# Patient Record
Sex: FEMALE | Race: WHITE | NOT HISPANIC OR LATINO | Employment: OTHER | URBAN - METROPOLITAN AREA
[De-identification: names, ages, dates, MRNs, and addresses within clinical notes are randomized per-mention and may not be internally consistent; named-entity substitution may affect disease eponyms.]

---

## 2017-02-27 ENCOUNTER — TELEPHONE (OUTPATIENT)
Dept: BARIATRICS | Facility: CLINIC | Age: 57
End: 2017-02-27

## 2017-03-22 DIAGNOSIS — Z00.00 ROUTINE GENERAL MEDICAL EXAMINATION AT A HEALTH CARE FACILITY: Primary | ICD-10-CM

## 2017-04-12 RX ORDER — LEVOTHYROXINE SODIUM 112 UG/1
112 TABLET ORAL
Qty: 90 TABLET | Refills: 0 | Status: SHIPPED | OUTPATIENT
Start: 2017-04-12 | End: 2018-10-19

## 2017-05-17 ENCOUNTER — TELEPHONE (OUTPATIENT)
Dept: INTERNAL MEDICINE | Facility: CLINIC | Age: 57
End: 2017-05-17

## 2017-05-17 NOTE — TELEPHONE ENCOUNTER
Spoke with Nallely at [facility], states that she thinks it was about getting [employer] and that dept was out for lunch. Left message for someone to return call.

## 2017-05-17 NOTE — TELEPHONE ENCOUNTER
----- Message from Quoc Linton sent at 5/17/2017  8:46 AM CDT -----  Contact: Alessandra Rodriguez [phone]  Called and stated, I faxed over a request on behalf of the above pt on 05/12/2017 requiring your approval/Signature, please advice    Thanks

## 2017-05-19 ENCOUNTER — TELEPHONE (OUTPATIENT)
Dept: INTERNAL MEDICINE | Facility: CLINIC | Age: 57
End: 2017-05-19

## 2017-05-19 NOTE — TELEPHONE ENCOUNTER
----- Message from Flavia Samson sent at 5/19/2017  9:12 AM CDT -----  Contact: Tien Lindo/ [facility]/[phone]  Tien with the diet clinic is calling in regards following up on request form for approval patient need to be seen in that clinic. Tien stated that form was faxed 05/12/17, and if has not been receive to please inform, for him to refax it. Please call and advise.             Thank you

## 2017-05-19 NOTE — TELEPHONE ENCOUNTER
----- Message from Flavia Samson sent at 5/19/2017  9:12 AM CDT -----  Contact: Tien Lindo/ [facility]/[phone]  Tien with the diet clinic is calling in regards following up on request form for approval patient need to be seen in that clinic. Tien stated that form was faxed 05/12/17, and if has not been receive to please inform, for him to refax it. Please call and advise.             Thank you

## 2017-05-26 ENCOUNTER — TELEPHONE (OUTPATIENT)
Dept: INTERNAL MEDICINE | Facility: CLINIC | Age: 57
End: 2017-05-26

## 2017-05-26 NOTE — TELEPHONE ENCOUNTER
----- Message from Quoc Linton sent at 5/26/2017  9:41 AM CDT -----  Contact: Tien Aparicio, Alessandra [facility] [phone]  Tien is calling to get a response about the order for the Diet program he faxed over for the above pt,requesting a call back, advice    Thanks

## 2017-07-14 ENCOUNTER — CLINICAL SUPPORT (OUTPATIENT)
Dept: INFECTIOUS DISEASES | Facility: CLINIC | Age: 57
End: 2017-07-14
Payer: COMMERCIAL

## 2017-07-14 ENCOUNTER — HOSPITAL ENCOUNTER (OUTPATIENT)
Dept: RADIOLOGY | Facility: HOSPITAL | Age: 57
Discharge: HOME | End: 2017-07-14
Attending: INTERNAL MEDICINE
Payer: COMMERCIAL

## 2017-07-14 ENCOUNTER — HOSPITAL ENCOUNTER (OUTPATIENT)
Dept: CARDIOLOGY | Facility: CLINIC | Age: 57
Discharge: HOME | End: 2017-07-14
Payer: COMMERCIAL

## 2017-07-14 ENCOUNTER — OFFICE VISIT (OUTPATIENT)
Dept: INTERNAL MEDICINE | Facility: CLINIC | Age: 57
End: 2017-07-14
Payer: COMMERCIAL

## 2017-07-14 ENCOUNTER — CLINICAL SUPPORT (OUTPATIENT)
Dept: INTERNAL MEDICINE | Facility: CLINIC | Age: 57
End: 2017-07-14
Payer: COMMERCIAL

## 2017-07-14 ENCOUNTER — HOSPITAL ENCOUNTER (OUTPATIENT)
Dept: RADIOLOGY | Facility: CLINIC | Age: 57
Discharge: HOME | End: 2017-07-14
Payer: COMMERCIAL

## 2017-07-14 VITALS
HEART RATE: 69 BPM | OXYGEN SATURATION: 96 % | HEIGHT: 64 IN | TEMPERATURE: 98 F | SYSTOLIC BLOOD PRESSURE: 118 MMHG | BODY MASS INDEX: 33.57 KG/M2 | WEIGHT: 196.63 LBS | DIASTOLIC BLOOD PRESSURE: 78 MMHG

## 2017-07-14 VITALS — BODY MASS INDEX: 33.46 KG/M2 | WEIGHT: 196 LBS | HEIGHT: 64 IN

## 2017-07-14 DIAGNOSIS — Z00.00 HEALTH CARE MAINTENANCE: Primary | ICD-10-CM

## 2017-07-14 DIAGNOSIS — Z00.00 ROUTINE GENERAL MEDICAL EXAMINATION AT A HEALTH CARE FACILITY: ICD-10-CM

## 2017-07-14 DIAGNOSIS — E03.9 PRIMARY HYPOTHYROIDISM: ICD-10-CM

## 2017-07-14 DIAGNOSIS — Z12.31 ENCOUNTER FOR SCREENING MAMMOGRAM FOR BREAST CANCER: ICD-10-CM

## 2017-07-14 DIAGNOSIS — Z00.00 ROUTINE GENERAL MEDICAL EXAMINATION AT A HEALTH CARE FACILITY: Primary | ICD-10-CM

## 2017-07-14 DIAGNOSIS — M85.80 OSTEOPENIA, UNSPECIFIED LOCATION: ICD-10-CM

## 2017-07-14 LAB
25(OH)D3+25(OH)D2 SERPL-MCNC: 42 NG/ML
ALBUMIN SERPL BCP-MCNC: 4 G/DL
ALP SERPL-CCNC: 83 U/L
ALT SERPL W/O P-5'-P-CCNC: 17 U/L
ANION GAP SERPL CALC-SCNC: 10 MMOL/L
AST SERPL-CCNC: 23 U/L
BILIRUB SERPL-MCNC: 0.7 MG/DL
BUN SERPL-MCNC: 18 MG/DL
CALCIUM SERPL-MCNC: 9.5 MG/DL
CHLORIDE SERPL-SCNC: 103 MMOL/L
CHOLEST/HDLC SERPL: 2.8 {RATIO}
CO2 SERPL-SCNC: 26 MMOL/L
CREAT SERPL-MCNC: 0.8 MG/DL
ERYTHROCYTE [DISTWIDTH] IN BLOOD BY AUTOMATED COUNT: 13.6 %
EST. GFR  (AFRICAN AMERICAN): >60 ML/MIN/1.73 M^2
EST. GFR  (NON AFRICAN AMERICAN): >60 ML/MIN/1.73 M^2
GLUCOSE SERPL-MCNC: 79 MG/DL
HCT VFR BLD AUTO: 39.8 %
HDL/CHOLESTEROL RATIO: 36.1 %
HDLC SERPL-MCNC: 183 MG/DL
HDLC SERPL-MCNC: 66 MG/DL
HGB BLD-MCNC: 13.7 G/DL
HIV 1+2 AB+HIV1 P24 AG SERPL QL IA: NEGATIVE
LDLC SERPL CALC-MCNC: 107 MG/DL
MCH RBC QN AUTO: 31.5 PG
MCHC RBC AUTO-ENTMCNC: 34.4 %
MCV RBC AUTO: 92 FL
NONHDLC SERPL-MCNC: 117 MG/DL
PLATELET # BLD AUTO: 216 K/UL
PMV BLD AUTO: 10.3 FL
POTASSIUM SERPL-SCNC: 4.1 MMOL/L
PROT SERPL-MCNC: 7.5 G/DL
RBC # BLD AUTO: 4.35 M/UL
SODIUM SERPL-SCNC: 139 MMOL/L
TRIGL SERPL-MCNC: 50 MG/DL
TSH SERPL DL<=0.005 MIU/L-ACNC: 3.99 UIU/ML
WBC # BLD AUTO: 5.02 K/UL

## 2017-07-14 PROCEDURE — 80061 LIPID PANEL: CPT

## 2017-07-14 PROCEDURE — 77063 BREAST TOMOSYNTHESIS BI: CPT | Mod: 26,,, | Performed by: RADIOLOGY

## 2017-07-14 PROCEDURE — 80053 COMPREHEN METABOLIC PANEL: CPT

## 2017-07-14 PROCEDURE — 85027 COMPLETE CBC AUTOMATED: CPT

## 2017-07-14 PROCEDURE — 99999 PR PBB SHADOW E&M-EST. PATIENT-LVL III: CPT | Mod: PBBFAC,,, | Performed by: INTERNAL MEDICINE

## 2017-07-14 PROCEDURE — 93000 ELECTROCARDIOGRAM COMPLETE: CPT | Mod: S$GLB,,, | Performed by: INTERNAL MEDICINE

## 2017-07-14 PROCEDURE — 99999 PR PBB SHADOW E&M-EST. PATIENT-LVL I: CPT | Mod: PBBFAC,,,

## 2017-07-14 PROCEDURE — 86703 HIV-1/HIV-2 1 RESULT ANTBDY: CPT

## 2017-07-14 PROCEDURE — 77067 SCR MAMMO BI INCL CAD: CPT | Mod: 26,,, | Performed by: RADIOLOGY

## 2017-07-14 PROCEDURE — 90715 TDAP VACCINE 7 YRS/> IM: CPT | Mod: S$GLB,,, | Performed by: INTERNAL MEDICINE

## 2017-07-14 PROCEDURE — 77067 SCR MAMMO BI INCL CAD: CPT | Mod: TC

## 2017-07-14 PROCEDURE — 90471 IMMUNIZATION ADMIN: CPT | Mod: S$GLB,,, | Performed by: INTERNAL MEDICINE

## 2017-07-14 PROCEDURE — 71020 XR CHEST PA AND LATERAL: CPT | Mod: TC

## 2017-07-14 PROCEDURE — 77080 DXA BONE DENSITY AXIAL: CPT | Mod: 26,,, | Performed by: INTERNAL MEDICINE

## 2017-07-14 PROCEDURE — 71020 XR CHEST PA AND LATERAL: CPT | Mod: 26,,, | Performed by: RADIOLOGY

## 2017-07-14 PROCEDURE — 77080 DXA BONE DENSITY AXIAL: CPT | Mod: TC

## 2017-07-14 PROCEDURE — 84443 ASSAY THYROID STIM HORMONE: CPT

## 2017-07-14 PROCEDURE — 82306 VITAMIN D 25 HYDROXY: CPT

## 2017-07-14 PROCEDURE — 99396 PREV VISIT EST AGE 40-64: CPT | Mod: S$GLB,,, | Performed by: INTERNAL MEDICINE

## 2017-07-14 NOTE — PATIENT INSTRUCTIONS
Please make sure you get enough vitamin D and calcium. Goal is 1200mg calcium and 800 IU of vitamin D daily.

## 2017-07-14 NOTE — PROGRESS NOTES
"Subjective:       Patient ID: Kim Garcia is a 57 y.o. female.    Chief Complaint: [employer]    HPI   58 yo here for [employer] physical.     Cmp, cbc, lipids nl  hiv pending    Osteopenia on bone density.   Taking calcium and vitamin d OTC.     She is doing Ideal protein. Hx gastric sleeve.   No alcohol currently as working on weight loss.     Review of Systems   Constitutional: Negative for fever.   HENT: Negative.    Eyes: Negative.    Respiratory: Negative for shortness of breath.    Cardiovascular: Negative for chest pain and leg swelling.   Gastrointestinal: Negative for abdominal pain, diarrhea, nausea and vomiting.   Genitourinary: Negative.    Musculoskeletal: Negative for arthralgias.   Skin: Negative for rash.   Psychiatric/Behavioral: Negative.        Objective:   /78 (BP Location: Left arm, Patient Position: Sitting, BP Method: Manual)   Pulse 69   Temp 97.5 °F (36.4 °C) (Oral)   Ht 5' 4" (1.626 m)   Wt 89.2 kg (196 lb 10.4 oz)   SpO2 96%   BMI 33.76 kg/m²      Physical Exam   Constitutional: She is oriented to person, place, and time. She appears well-developed and well-nourished. No distress.   HENT:   Head: Normocephalic and atraumatic.   Cardiovascular: Normal rate and regular rhythm.    Pulmonary/Chest: Effort normal. No respiratory distress. She has no wheezes. She has no rales.   Neurological: She is alert and oriented to person, place, and time.   Skin: Skin is warm and dry. She is not diaphoretic.   Psychiatric: She has a normal mood and affect. Her behavior is normal.       Assessment:       1. Health care maintenance    2. Primary hypothyroidism, 1999    3. Osteopenia, unspecified location        Plan:       Kim was seen today for [employer].    Diagnoses and all orders for this visit:    Health care maintenance  Labs reviewed  Will get [facility] at [facility] today  mmg today scheduled  Will do pap with gyn at [facility]    Primary hypothyroidism  Will " add tsh to labs  Cont levothyroxine 112mcg    Osteopenia, unspecified location  Discussed calcium and vitamin d  Will add vitamin d to labs        Gyn - [facility] - due to have pap

## 2017-11-05 DIAGNOSIS — Z98.84 BARIATRIC SURGERY STATUS: ICD-10-CM

## 2017-11-05 DIAGNOSIS — E55.9 VITAMIN D DEFICIENCY DISEASE: Primary | ICD-10-CM

## 2017-11-05 DIAGNOSIS — E03.9 PRIMARY HYPOTHYROIDISM: ICD-10-CM

## 2017-11-06 RX ORDER — LEVOTHYROXINE SODIUM 112 UG/1
112 TABLET ORAL
Qty: 90 TABLET | Refills: 3 | Status: SHIPPED | OUTPATIENT
Start: 2017-11-06 | End: 2019-02-22 | Stop reason: SDUPTHER

## 2017-11-06 NOTE — TELEPHONE ENCOUNTER
Last seen November 2015. Saw another provider for health maintenance in July 2017.     please place on schedule for physical in July 2018. Patient should call for an appointment in April or May 2018

## 2018-02-16 ENCOUNTER — OFFICE VISIT (OUTPATIENT)
Dept: URGENT CARE | Facility: CLINIC | Age: 58
End: 2018-02-16
Payer: COMMERCIAL

## 2018-02-16 VITALS
SYSTOLIC BLOOD PRESSURE: 131 MMHG | BODY MASS INDEX: 37.56 KG/M2 | TEMPERATURE: 99 F | HEIGHT: 64 IN | DIASTOLIC BLOOD PRESSURE: 88 MMHG | RESPIRATION RATE: 16 BRPM | HEART RATE: 81 BPM | OXYGEN SATURATION: 98 % | WEIGHT: 220 LBS

## 2018-02-16 DIAGNOSIS — R05.9 COUGH: ICD-10-CM

## 2018-02-16 DIAGNOSIS — J06.9 UPPER RESPIRATORY TRACT INFECTION, UNSPECIFIED TYPE: Primary | ICD-10-CM

## 2018-02-16 DIAGNOSIS — Z20.828 EXPOSURE TO THE FLU: ICD-10-CM

## 2018-02-16 LAB
CTP QC/QA: YES
FLUAV AG NPH QL: NEGATIVE
FLUBV AG NPH QL: NEGATIVE

## 2018-02-16 PROCEDURE — 99214 OFFICE O/P EST MOD 30 MIN: CPT | Mod: S$GLB,,, | Performed by: NURSE PRACTITIONER

## 2018-02-16 PROCEDURE — 3008F BODY MASS INDEX DOCD: CPT | Mod: S$GLB,,, | Performed by: NURSE PRACTITIONER

## 2018-02-16 PROCEDURE — 87804 INFLUENZA ASSAY W/OPTIC: CPT | Mod: QW,S$GLB,, | Performed by: NURSE PRACTITIONER

## 2018-02-16 RX ORDER — BENZONATATE 100 MG/1
100 CAPSULE ORAL EVERY 6 HOURS PRN
Qty: 30 CAPSULE | Refills: 1 | Status: SHIPPED | OUTPATIENT
Start: 2018-02-16 | End: 2018-10-19

## 2018-02-16 RX ORDER — PROMETHAZINE HYDROCHLORIDE AND DEXTROMETHORPHAN HYDROBROMIDE 6.25; 15 MG/5ML; MG/5ML
5 SYRUP ORAL NIGHTLY PRN
Qty: 118 ML | Refills: 0 | Status: SHIPPED | OUTPATIENT
Start: 2018-02-16 | End: 2018-02-26

## 2018-02-16 RX ORDER — OSELTAMIVIR PHOSPHATE 75 MG/1
75 CAPSULE ORAL DAILY
Qty: 10 CAPSULE | Refills: 0 | Status: SHIPPED | OUTPATIENT
Start: 2018-02-16 | End: 2018-02-26

## 2018-02-16 NOTE — PATIENT INSTRUCTIONS
"                                                         URI   If your condition worsens or fails to improve we recommend that you receive another evaluation at the ER immediately or contact your PCP to discuss your concerns or return here. You must understand that you've received an urgent care treatment only and that you may be released before all your medical problems are known or treated. You the patient will arrange for follouwp care as instructed.   If we discussed that I think your illness is viral, it will not respond to antibiotics and will last 5-7 days. If we discussed "wait and see" antibiotics and if over the next few days the symptoms worsen start the antibiotics I have given you.   -  If you are female and on BCP and do take the antibiotics, use additional methods to prevent pregnancy while on the antibiotics and for one cycle after.   -  Flonase (fluticasone) is a nasal spray which is available over the counter and may help with your symptoms.   Take prescription cough meds (pills) as prescribed; take prescription cough syrup at night as needed for cough.  Do not take both the prescribed cough pills and syrup at the same time.   -  If you have hypertension avoid using the "D" which is the decongestant.  Instead you can use Coricidin HBP for cold and cough symptoms.    -  If you just have drainage you can take plain Zyrtec, Claritin or Allegra   -  If you just have a congested feeling you can take pseudoephedrine (unless you have high blood pressure) which you have to sign for behind the counter. Do not buy the phenylephrine which is on the shelf as it is not effective   -  Rest and fluids are also important.   -  Tylenol or ibuprofen can also be used as directed for pain unless you have an allergy to them or medical condition such as stomach ulcers, kidney or liver disease or blood thinners etc for which you should not be taking these type of medications.   -  If you are flying in the next few days " Afrin nose drops for the airplane flight upon take off and landing may help. Other than at those times refrain from using afrin.   - If you were prescribed a narcotic do not drive or operate heavy machinery while taking these medications.       FLU Exposure  You were exposed to sick contacts and prescribed prophylaxis treatment against Influenza.  Please take meds as prescribed.  Continue symptomatic care at home.    Rest and fluids are important.   Follow up with your PCP in the next 2-3 days if no improvement.  Follow up in the ER for any worsening of symptoms.                                                          Viral Upper Respiratory Illness (Adult)  You have a viral upper respiratory illness (URI), which is another term for the common cold. This illness is contagious during the first few days. It is spread through the air by coughing and sneezing. It may also be spread by direct contact (touching the sick person and then touching your own eyes, nose, or mouth). Frequent handwashing will decrease risk of spread. Most viral illnesses go away within 7 to 10 days with rest and simple home remedies. Sometimes the illness may last for several weeks. Antibiotics will not kill a virus, and they are generally not prescribed for this condition.    Home care  · If symptoms are severe, rest at home for the first 2 to 3 days. When you resume activity, don't let yourself get too tired.  · Avoid being exposed to cigarette smoke (yours or others).  · You may use acetaminophen or ibuprofen to control pain and fever, unless another medicine was prescribed. (Note: If you have chronic liver or kidney disease, have ever had a stomach ulcer or gastrointestinal bleeding, or are taking blood-thinning medicines, talk with your healthcare provider before using these medicines.) Aspirin should never be given to anyone under 18 years of age who is ill with a viral infection or fever. It may cause severe liver or brain  damage.  · Your appetite may be poor, so a light diet is fine. Avoid dehydration by drinking 6 to 8 glasses of fluids per day (water, soft drinks, juices, tea, or soup). Extra fluids will help loosen secretions in the nose and lungs.  · Over-the-counter cold medicines will not shorten the length of time youre sick, but they may be helpful for the following symptoms: cough, sore throat, and nasal and sinus congestion. (Note: Do not use decongestants if you have high blood pressure.)  Follow-up care  Follow up with your healthcare provider, or as advised.  When to seek medical advice  Call your healthcare provider right away if any of these occur:  · Cough with lots of colored sputum (mucus)  · Severe headache; face, neck, or ear pain  · Difficulty swallowing due to throat pain  · Fever of 100.4°F (38°C)  Call 911, or get immediate medical care  Call emergency services right away if any of these occur:  · Chest pain, shortness of breath, wheezing, or difficulty breathing  · Coughing up blood  · Inability to swallow due to throat pain  Date Last Reviewed: 9/13/2015  © 0240-9382 [employer]. [address] [place] [address], [place] [address]. All rights reserved. This information is not intended as a substitute for professional medical care. Always follow your healthcare professional's instructions.

## 2018-02-16 NOTE — PROGRESS NOTES
"Subjective:       Patient ID: Kim Garcia is a 57 y.o. female.    Vitals:  height is 5' 4" (1.626 m) and weight is 99.8 kg (220 lb). Her oral temperature is 98.7 °F (37.1 °C). Her blood pressure is 131/88 and her pulse is 81. Her respiration is 16 and oxygen saturation is 98%.     Chief Complaint: Cough (began 2-3 days ago) and Sore Throat    Pt presents with a cough that began 2-3 days ago. Pt states cough is productive but doesn't know the color of phlegm. Pt states she has a scratchy throat as well. Pt states she has slight chills but nothing major. Pt also states she feels congested in the nose as of today. Pt denies any otc meds today.    Patient works at [employer] where coworkers have been sick and one recently diagnosed with FLU. 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 The current episode started in the past 7 days. The problem has been gradually worsening. The problem occurs constantly. The cough is productive of sputum. Associated symptoms include a sore throat. Pertinent negatives include no chest pain, chills, ear pain, eye redness, fever, headaches, myalgias, shortness of breath or wheezing.   Sore Throat    Associated symptoms include congestion and coughing. Pertinent negatives include no abdominal pain, ear pain, headaches, hoarse voice or shortness of breath.     Review of Systems   Constitution: Negative for chills, fever and malaise/fatigue.   HENT: Positive for congestion and sore throat. Negative for ear pain and hoarse voice.    Eyes: Negative for discharge and redness.   Cardiovascular: Negative for chest pain, dyspnea on exertion and leg swelling.   Respiratory: Positive for cough and sputum production. Negative for shortness of breath and wheezing.    Musculoskeletal: Negative for myalgias.   Gastrointestinal: Negative for abdominal pain and nausea.   Neurological: Negative for headaches.       Objective:      Physical Exam "   Constitutional: She is oriented to person, place, and time. She appears well-developed and well-nourished. She is cooperative.  Non-toxic appearance. She does not appear ill. No distress.   HENT:   Head: Normocephalic and atraumatic.   Right Ear: Hearing, tympanic membrane, external ear and ear canal normal.   Left Ear: Hearing, tympanic membrane, external ear and ear canal normal.   Nose: Mucosal edema and rhinorrhea present. No nasal deformity. No epistaxis. Right sinus exhibits no maxillary sinus tenderness and no frontal sinus tenderness. Left sinus exhibits no maxillary sinus tenderness and no frontal sinus tenderness.   Mouth/Throat: Uvula is midline and mucous membranes are normal. No trismus in the jaw. Normal dentition. No uvula swelling. Posterior oropharyngeal erythema (post nasal drainage noted) present. Tonsils are 1+ on the right. Tonsils are 1+ on the left.   Eyes: Conjunctivae, EOM and lids are normal. Pupils are equal, round, and reactive to light. No scleral icterus.   Sclera clear bilat   Neck: Trachea normal, normal range of motion, full passive range of motion without pain and phonation normal. Neck supple.   Cardiovascular: Normal rate, regular rhythm, normal heart sounds, intact distal pulses and normal pulses.    Pulmonary/Chest: Effort normal and breath sounds normal. No respiratory distress.   Abdominal: Soft. Normal appearance and bowel sounds are normal. She exhibits no distension. There is no tenderness.   Musculoskeletal: Normal range of motion. She exhibits no edema or deformity.   Neurological: She is alert and oriented to person, place, and time. She exhibits normal muscle tone. Coordination normal.   Skin: Skin is warm, dry and intact. She is not diaphoretic. No pallor.   Psychiatric: She has a normal mood and affect. Her speech is normal and behavior is normal. Judgment and thought content normal. Cognition and memory are normal.   Nursing note and vitals reviewed.     "  Assessment:       1. Upper respiratory tract infection, unspecified type    2. Cough    3. Exposure to the flu        Plan:         Upper respiratory tract infection, unspecified type    Cough  -     POCT INFLUENZA A/B  -     benzonatate (TESSALON PERLES) 100 MG capsule; Take 1 capsule (100 mg total) by mouth every 6 (six) hours as needed for Cough.  Dispense: 30 capsule; Refill: 1  -     promethazine-dextromethorphan (PROMETHAZINE-DM) 6.25-15 mg/5 mL Syrp; Take 5 mLs by mouth nightly as needed.  Dispense: 118 mL; Refill: 0    Exposure to the flu  -     oseltamivir (TAMIFLU) 75 MG capsule; Take 1 capsule (75 mg total) by mouth once daily.  Dispense: 10 capsule; Refill: 0      Patient Instructions                                                            URI   If your condition worsens or fails to improve we recommend that you receive another evaluation at the ER immediately or contact your PCP to discuss your concerns or return here. You must understand that you've received an urgent care treatment only and that you may be released before all your medical problems are known or treated. You the patient will arrange for [facility] care as instructed.   If we discussed that I think your illness is viral, it will not respond to antibiotics and will last 5-7 days. If we discussed "wait and see" antibiotics and if over the next few days the symptoms worsen start the antibiotics I have given you.   -  If you are female and on BCP and do take the antibiotics, use additional methods to prevent pregnancy while on the antibiotics and for one cycle after.   -  Flonase (fluticasone) is a nasal spray which is available over the counter and may help with your symptoms.   Take prescription cough meds (pills) as prescribed; take prescription cough syrup at night as needed for cough.  Do not take both the prescribed cough pills and syrup at the same time.   -  If you have hypertension avoid using the "D" which is the decongestant.  " Instead you can use Coricidin HBP for cold and cough symptoms.    -  If you just have drainage you can take plain Zyrtec, Claritin or Allegra   -  If you just have a congested feeling you can take pseudoephedrine (unless you have high blood pressure) which you have to sign for behind the counter. Do not buy the phenylephrine which is on the shelf as it is not effective   -  Rest and fluids are also important.   -  Tylenol or ibuprofen can also be used as directed for pain unless you have an allergy to them or medical condition such as stomach ulcers, kidney or liver disease or blood thinners etc for which you should not be taking these type of medications.   -  If you are flying in the next few days Afrin nose drops for the airplane flight upon take off and landing may help. Other than at those times refrain from using afrin.   - If you were prescribed a narcotic do not drive or operate heavy machinery while taking these medications.       FLU Exposure  You were exposed to sick contacts and prescribed prophylaxis treatment against Influenza.  Please take meds as prescribed.  Continue symptomatic care at home.    Rest and fluids are important.   Follow up with your PCP in the next 2-3 days if no improvement.  Follow up in the ER for any worsening of symptoms.                                                          Viral Upper Respiratory Illness (Adult)  You have a viral upper respiratory illness (URI), which is another term for the common cold. This illness is contagious during the first few days. It is spread through the air by coughing and sneezing. It may also be spread by direct contact (touching the sick person and then touching your own eyes, nose, or mouth). Frequent handwashing will decrease risk of spread. Most viral illnesses go away within 7 to 10 days with rest and simple home remedies. Sometimes the illness may last for several weeks. Antibiotics will not kill a virus, and they are generally not  prescribed for this condition.    Home care  · If symptoms are severe, rest at home for the first 2 to 3 days. When you resume activity, don't let yourself get too tired.  · Avoid being exposed to cigarette smoke (yours or others).  · You may use acetaminophen or ibuprofen to control pain and fever, unless another medicine was prescribed. (Note: If you have chronic liver or kidney disease, have ever had a stomach ulcer or gastrointestinal bleeding, or are taking blood-thinning medicines, talk with your healthcare provider before using these medicines.) Aspirin should never be given to anyone under 18 years of age who is ill with a viral infection or fever. It may cause severe liver or brain damage.  · Your appetite may be poor, so a light diet is fine. Avoid dehydration by drinking 6 to 8 glasses of fluids per day (water, soft drinks, juices, tea, or soup). Extra fluids will help loosen secretions in the nose and lungs.  · Over-the-counter cold medicines will not shorten the length of time youre sick, but they may be helpful for the following symptoms: cough, sore throat, and nasal and sinus congestion. (Note: Do not use decongestants if you have high blood pressure.)  Follow-up care  Follow up with your healthcare provider, or as advised.  When to seek medical advice  Call your healthcare provider right away if any of these occur:  · Cough with lots of colored sputum (mucus)  · Severe headache; face, neck, or ear pain  · Difficulty swallowing due to throat pain  · Fever of 100.4°F (38°C)  Call 911, or get immediate medical care  Call emergency services right away if any of these occur:  · Chest pain, shortness of breath, wheezing, or difficulty breathing  · Coughing up blood  · Inability to swallow due to throat pain  Date Last Reviewed: 9/13/2015  © 7584-7693 [employer]. [address] [place] [address], [place] [address]. All rights reserved. This information is not intended as a substitute for  professional medical care. Always follow your healthcare professional's instructions.

## 2018-06-08 ENCOUNTER — OFFICE VISIT (OUTPATIENT)
Dept: INTERNAL MEDICINE | Facility: CLINIC | Age: 58
End: 2018-06-08
Payer: COMMERCIAL

## 2018-06-08 VITALS
OXYGEN SATURATION: 98 % | HEIGHT: 64 IN | SYSTOLIC BLOOD PRESSURE: 112 MMHG | HEART RATE: 70 BPM | WEIGHT: 228 LBS | DIASTOLIC BLOOD PRESSURE: 84 MMHG | BODY MASS INDEX: 38.93 KG/M2

## 2018-06-08 DIAGNOSIS — R07.89 CHEST TIGHTNESS OR PRESSURE: Primary | ICD-10-CM

## 2018-06-08 PROCEDURE — 99999 PR PBB SHADOW E&M-EST. PATIENT-LVL III: CPT | Mod: PBBFAC,,, | Performed by: INTERNAL MEDICINE

## 2018-06-08 PROCEDURE — 99214 OFFICE O/P EST MOD 30 MIN: CPT | Mod: S$GLB,,, | Performed by: INTERNAL MEDICINE

## 2018-06-08 NOTE — PROGRESS NOTES
"Subjective:       Patient ID: Kim Garcia is a 57 y.o. female.    Chief Complaint: Cough (pt has been having a cough ans suddenly develops SOB. no signs of chest pain.) and Hypertension (pt states that she may have HTN due to readings she's been recieving lately. pt's most recent reading was 250/90.)    Cough   This is a new problem. The current episode started 1 to 4 weeks ago. The problem has been waxing and waning. The problem occurs every few hours. The cough is non-productive. Associated symptoms include shortness of breath. Pertinent negatives include no chest pain or fever.   Hypertension   Associated symptoms include shortness of breath. Pertinent negatives include no chest pain.      58 yo F here for urgent eval of cough and elevated blood pressure reading.     Intermittently feeling of something heavy on her chest breifly with coughing. Father recently .   Dry cough for over a month.       Review of Systems   Constitutional: Negative for fever.   Respiratory: Positive for cough and shortness of breath.    Cardiovascular: Negative for chest pain.   Musculoskeletal: Negative.    Skin: Negative.        Objective:   /84   Pulse 70   Ht 5' 4" (1.626 m)   Wt 103.4 kg (228 lb)   SpO2 98%   BMI 39.14 kg/m²      Physical Exam   Constitutional: She is oriented to person, place, and time. She appears well-developed and well-nourished. No distress.   HENT:   Head: Normocephalic and atraumatic.   Cardiovascular: Normal rate and regular rhythm.    Pulmonary/Chest: Effort normal. No respiratory distress. She has no wheezes. She has no rales.   Neurological: She is alert and oriented to person, place, and time.   Skin: Skin is warm and dry. She is not diaphoretic.   Psychiatric: She has a normal mood and affect. Her behavior is normal.       Assessment:       1. Chest tightness or pressure        Plan:       Kim was seen today for cough and hypertension.    Diagnoses and all orders for this " visit:    Chest tightness or pressure  -     X-Ray Chest PA And Lateral; Future  -     Exercise stress echo; Future  -     EKG 12-lead; Future   Schedule routine labs

## 2018-06-11 ENCOUNTER — PATIENT MESSAGE (OUTPATIENT)
Dept: INTERNAL MEDICINE | Facility: CLINIC | Age: 58
End: 2018-06-11

## 2018-06-12 ENCOUNTER — HOSPITAL ENCOUNTER (OUTPATIENT)
Dept: RADIOLOGY | Facility: HOSPITAL | Age: 58
Discharge: HOME | End: 2018-06-12
Attending: INTERNAL MEDICINE
Payer: COMMERCIAL

## 2018-06-12 DIAGNOSIS — R07.89 CHEST TIGHTNESS OR PRESSURE: ICD-10-CM

## 2018-06-12 PROCEDURE — 71046 X-RAY EXAM CHEST 2 VIEWS: CPT | Mod: TC

## 2018-06-12 PROCEDURE — 71046 X-RAY EXAM CHEST 2 VIEWS: CPT | Mod: 26,,, | Performed by: RADIOLOGY

## 2018-06-14 ENCOUNTER — HOSPITAL ENCOUNTER (OUTPATIENT)
Dept: CARDIOLOGY | Facility: CLINIC | Age: 58
Discharge: HOME | End: 2018-06-14
Attending: INTERNAL MEDICINE
Payer: COMMERCIAL

## 2018-06-14 ENCOUNTER — HOSPITAL ENCOUNTER (OUTPATIENT)
Dept: CARDIOLOGY | Facility: CLINIC | Age: 58
Discharge: HOME | End: 2018-06-14
Payer: COMMERCIAL

## 2018-06-14 DIAGNOSIS — R07.89 CHEST TIGHTNESS OR PRESSURE: ICD-10-CM

## 2018-06-14 LAB
DIASTOLIC DYSFUNCTION: NO
RETIRED EF AND QEF - SEE NOTES: 63 (ref 55–65)

## 2018-06-14 PROCEDURE — 93000 ELECTROCARDIOGRAM COMPLETE: CPT | Mod: S$GLB,,, | Performed by: INTERNAL MEDICINE

## 2018-06-14 PROCEDURE — 93351 STRESS TTE COMPLETE: CPT | Mod: S$GLB,,, | Performed by: INTERNAL MEDICINE

## 2018-06-14 PROCEDURE — 93321 DOPPLER ECHO F-UP/LMTD STD: CPT | Mod: S$GLB,,, | Performed by: INTERNAL MEDICINE

## 2018-10-19 ENCOUNTER — OFFICE VISIT (OUTPATIENT)
Dept: URGENT CARE | Facility: CLINIC | Age: 58
End: 2018-10-19
Payer: COMMERCIAL

## 2018-10-19 VITALS
WEIGHT: 228 LBS | DIASTOLIC BLOOD PRESSURE: 78 MMHG | HEART RATE: 96 BPM | RESPIRATION RATE: 17 BRPM | SYSTOLIC BLOOD PRESSURE: 144 MMHG | BODY MASS INDEX: 38.93 KG/M2 | OXYGEN SATURATION: 99 % | TEMPERATURE: 99 F | HEIGHT: 64 IN

## 2018-10-19 DIAGNOSIS — S61.412A LACERATION OF LEFT HAND WITHOUT FOREIGN BODY, INITIAL ENCOUNTER: Primary | ICD-10-CM

## 2018-10-19 PROCEDURE — 12001 RPR S/N/AX/GEN/TRNK 2.5CM/<: CPT | Mod: S$GLB,,, | Performed by: NURSE PRACTITIONER

## 2018-10-19 PROCEDURE — 99214 OFFICE O/P EST MOD 30 MIN: CPT | Mod: 25,S$GLB,, | Performed by: NURSE PRACTITIONER

## 2018-10-19 NOTE — PATIENT INSTRUCTIONS
Hand Laceration: All Closures  A laceration is a cut through the skin. You have a cut on the hand. Deep cuts usually require stitches (sutures) or staples. Minor cuts may be closed with surgical tape or skin adhesive.   X-rays may be done if something may have entered the skin through the cut. Your may also be given a tetanus shot. This may be given if you are not updated on this vaccination and the object that cut you may carry tetanus.    Home care  · Your healthcare provider may prescribe an antibiotic. This is to help prevent infection. Follow all instructions for taking this medicine. Take the medicine every day until it is gone or you are told to stop. You should not have any left over.  · The healthcare provider may prescribe medicines for pain. Follow instructions for taking them.  · Follow the healthcare providers instructions on how to care for the cut.  · Keep the wound clean and dry. Do not get the wound wet until you are told it is okay to do so. If the bandage gets wet, remove it. Gently pat the wound dry with a clean cloth. Then put on a clean, dry bandage..  · To help prevent infection, wash your hands with soap and water before and after caring for the wound.   · Caring for sutures or staples: Once you no longer need to keep them dry, clean the wound daily. First, remove the bandage. Then wash the area gently with soap and warm water, or as directed by the health care provider. Use a wet cotton swab to loosen and remove any blood or crust that forms. After cleaning, apply a thin layer of antibiotic ointment if advised. Then put on a new bandage unless you are told not to.  · Caring for skin glue: Dont put apply liquid, ointment, or cream on the wound while the glue is in place. Avoid activities that cause heavy sweating. Protect the wound from sunlight. Do not scratch, rub, or pick at the adhesive film. Do not place tape directly over the film. The glue should peel off within 5 to 10  days.   · Caring for surgical tape: Keep the area dry. If it gets wet, blot it dry with a clean towel. Surgical tape usually falls off within 7 to 10 days. If it has not fallen off after 10 days, you can take it off yourself. Put mineral oil or petroleum jelly on a cotton ball and gently rub the tape until it is removed.  · Once you can get the wound wet, you may shower as usual but do not soak the wound in water (no tub baths or swimming)  · Even with proper treatment, a wound infection may sometimes occur. Check the wound daily for signs of infection listed below.  Follow-up care  Follow up with your healthcare provider as advised. If you have stitches or staples, be sure to return as directed to have them removed.  When to seek medical advice  Call your healthcare provider right away if any of these occur:  · Wound bleeding not controlled by direct pressure  · Signs of infection, including increasing pain in the wound, increasing wound redness or swelling, or pus or bad odor coming from the wound  · Fever of 100.4°F (38.ºC) or as directed by your health care provider  · Stitches or staples come apart or fall out or surgical tape falls off before 7 days  · Wound edges re-open  · Wound changes colors  · Numbness or weakness in the affected hand   · Decreased movement of the hand  Date Last Reviewed: 6/10/2015  © 8717-3989 [employer]. [address] [place] [address]. All rights reserved. This information is not intended as a substitute for professional medical care. Always follow your healthcare professional's instructions.    Please arrange follow up with your primary medical clinic as soon as possible. You must understand that you've received an Urgent Care treatment only and that you may be released before all of your medical problems are known or treated. You, the patient, will arrange for follow up as instructed. If your symptoms worsen or fail to improve you should go to the Emergency  Room.

## 2018-10-19 NOTE — PROGRESS NOTES
"Subjective:       Patient ID: Kim Garcia is a 58 y.o. female.    Vitals:  height is 5' 4" (1.626 m) and weight is 103.4 kg (228 lb). Her temperature is 99 °F (37.2 °C). Her blood pressure is 144/78 (abnormal) and her pulse is 96. Her respiration is 17 and oxygen saturation is 99%.     Chief Complaint: Laceration    Pt c/o laceration to her left hand due to a fall that broke a vase   Pt's hand was cut from the broken glass       Laceration    The incident occurred 1 to 3 hours ago. The laceration is located on the left hand. The laceration is 1 cm in size. The laceration mechanism was a broken glass. The pain is at a severity of 6/10. The pain is mild. The pain has been constant since onset. It is unknown if a foreign body is present. Her tetanus status is UTD.     Review of Systems   Constitution: Negative for chills and fever.   HENT: Negative for sore throat.    Respiratory: Negative for shortness of breath.    Skin: Negative for itching and rash.        Lac palm of left hand   Musculoskeletal: Negative for joint pain.   All other systems reviewed and are negative.      Objective:      Physical Exam   Constitutional: She is oriented to person, place, and time. She appears well-developed and well-nourished.   HENT:   Head: Normocephalic and atraumatic. Head is without abrasion, without contusion and without laceration.   Right Ear: External ear normal.   Left Ear: External ear normal.   Nose: Nose normal.   Mouth/Throat: Oropharynx is clear and moist.   Eyes: Conjunctivae, EOM and lids are normal. Pupils are equal, round, and reactive to light.   Neck: Trachea normal, full passive range of motion without pain and phonation normal. Neck supple.   Cardiovascular: Normal rate, regular rhythm and normal heart sounds.   Pulmonary/Chest: Effort normal and breath sounds normal. No stridor. No respiratory distress.   Musculoskeletal: Normal range of motion.   Neurological: She is alert and oriented to person, place, " and time.   Skin: Skin is warm and dry. Capillary refill takes less than 2 seconds. Abrasion and laceration noted. No bruising, no burn, no ecchymosis, no lesion and no rash noted. No erythema.        Psychiatric: She has a normal mood and affect. Her speech is normal and behavior is normal. Judgment and thought content normal. Cognition and memory are normal.   Nursing note and vitals reviewed.      Assessment:       1. Laceration of left hand without foreign body, initial encounter        Plan:         Laceration of left hand without foreign body, initial encounter  -     Laceration Repair

## 2018-10-19 NOTE — PROCEDURES
Laceration Repair  Date/Time: 10/19/2018 5:55 PM  Performed by: Sammi Amaya NP  Authorized by: Sammi Amaya NP   Consent Done: Yes  Consent: Verbal consent obtained.  Risks and benefits: risks, benefits and alternatives were discussed  Consent given by: patient  Body area: upper extremity  Location details: left hand  Laceration length: 1.5 cm  Foreign bodies: no foreign bodies  Tendon involvement: none  Nerve involvement: none  Vascular damage: no  Patient sedated: no  Irrigation solution: saline  Irrigation method: syringe  Amount of cleaning: standard  Debridement: none  Degree of undermining: none  Skin closure: glue  Approximation: close  Approximation difficulty: simple  Patient tolerance: Patient tolerated the procedure well with no immediate complications

## 2019-02-13 DIAGNOSIS — Z00.00 ROUTINE GENERAL MEDICAL EXAMINATION AT A HEALTH CARE FACILITY: Primary | ICD-10-CM

## 2019-02-22 ENCOUNTER — HOSPITAL ENCOUNTER (OUTPATIENT)
Dept: CARDIOLOGY | Facility: CLINIC | Age: 59
Discharge: HOME | End: 2019-02-22
Payer: COMMERCIAL

## 2019-02-22 ENCOUNTER — HOSPITAL ENCOUNTER (OUTPATIENT)
Dept: RADIOLOGY | Facility: HOSPITAL | Age: 59
Discharge: HOME | End: 2019-02-22
Attending: INTERNAL MEDICINE
Payer: COMMERCIAL

## 2019-02-22 ENCOUNTER — OFFICE VISIT (OUTPATIENT)
Dept: INTERNAL MEDICINE | Facility: CLINIC | Age: 59
End: 2019-02-22
Payer: COMMERCIAL

## 2019-02-22 ENCOUNTER — CLINICAL SUPPORT (OUTPATIENT)
Dept: INTERNAL MEDICINE | Facility: CLINIC | Age: 59
End: 2019-02-22
Payer: COMMERCIAL

## 2019-02-22 VITALS
DIASTOLIC BLOOD PRESSURE: 80 MMHG | SYSTOLIC BLOOD PRESSURE: 138 MMHG | HEIGHT: 64 IN | BODY MASS INDEX: 39.09 KG/M2 | WEIGHT: 229 LBS | OXYGEN SATURATION: 97 % | HEART RATE: 77 BPM

## 2019-02-22 DIAGNOSIS — R03.0 ELEVATED BLOOD PRESSURE READING: ICD-10-CM

## 2019-02-22 DIAGNOSIS — Z00.00 ROUTINE GENERAL MEDICAL EXAMINATION AT A HEALTH CARE FACILITY: ICD-10-CM

## 2019-02-22 DIAGNOSIS — Z00.00 ROUTINE GENERAL MEDICAL EXAMINATION AT A HEALTH CARE FACILITY: Primary | ICD-10-CM

## 2019-02-22 DIAGNOSIS — Z00.00 HEALTH CARE MAINTENANCE: Primary | ICD-10-CM

## 2019-02-22 DIAGNOSIS — E03.9 PRIMARY HYPOTHYROIDISM: ICD-10-CM

## 2019-02-22 LAB
ALBUMIN SERPL BCP-MCNC: 4 G/DL
ALP SERPL-CCNC: 80 U/L
ALT SERPL W/O P-5'-P-CCNC: 16 U/L
ANION GAP SERPL CALC-SCNC: 11 MMOL/L
AST SERPL-CCNC: 23 U/L
BILIRUB SERPL-MCNC: 0.6 MG/DL
BUN SERPL-MCNC: 8 MG/DL
CALCIUM SERPL-MCNC: 9.8 MG/DL
CHLORIDE SERPL-SCNC: 104 MMOL/L
CHOLEST SERPL-MCNC: 187 MG/DL
CHOLEST/HDLC SERPL: 2.8 {RATIO}
CO2 SERPL-SCNC: 27 MMOL/L
CREAT SERPL-MCNC: 0.7 MG/DL
ERYTHROCYTE [DISTWIDTH] IN BLOOD BY AUTOMATED COUNT: 13 %
EST. GFR  (AFRICAN AMERICAN): >60 ML/MIN/1.73 M^2
EST. GFR  (NON AFRICAN AMERICAN): >60 ML/MIN/1.73 M^2
GLUCOSE SERPL-MCNC: 73 MG/DL
HCT VFR BLD AUTO: 40.4 %
HDLC SERPL-MCNC: 66 MG/DL
HDLC SERPL: 35.3 %
HGB BLD-MCNC: 13.1 G/DL
LDLC SERPL CALC-MCNC: 107.4 MG/DL
MCH RBC QN AUTO: 29.9 PG
MCHC RBC AUTO-ENTMCNC: 32.4 G/DL
MCV RBC AUTO: 92 FL
NONHDLC SERPL-MCNC: 121 MG/DL
PLATELET # BLD AUTO: 235 K/UL
PMV BLD AUTO: 11 FL
POTASSIUM SERPL-SCNC: 4.1 MMOL/L
PROT SERPL-MCNC: 7.2 G/DL
RBC # BLD AUTO: 4.38 M/UL
SODIUM SERPL-SCNC: 142 MMOL/L
TRIGL SERPL-MCNC: 68 MG/DL
TSH SERPL DL<=0.005 MIU/L-ACNC: 3.38 UIU/ML
WBC # BLD AUTO: 4.6 K/UL

## 2019-02-22 PROCEDURE — 99396 PR PREVENTIVE VISIT,EST,40-64: ICD-10-PCS | Mod: S$GLB,,, | Performed by: INTERNAL MEDICINE

## 2019-02-22 PROCEDURE — 77067 SCR MAMMO BI INCL CAD: CPT | Mod: 26,,, | Performed by: RADIOLOGY

## 2019-02-22 PROCEDURE — 84443 ASSAY THYROID STIM HORMONE: CPT

## 2019-02-22 PROCEDURE — 93000 ELECTROCARDIOGRAM COMPLETE: CPT | Mod: S$GLB,,, | Performed by: INTERNAL MEDICINE

## 2019-02-22 PROCEDURE — 36415 COLL VENOUS BLD VENIPUNCTURE: CPT

## 2019-02-22 PROCEDURE — 80053 COMPREHEN METABOLIC PANEL: CPT

## 2019-02-22 PROCEDURE — 77063 BREAST TOMOSYNTHESIS BI: CPT | Mod: 26,,, | Performed by: RADIOLOGY

## 2019-02-22 PROCEDURE — 77067 SCR MAMMO BI INCL CAD: CPT | Mod: TC

## 2019-02-22 PROCEDURE — 85027 COMPLETE CBC AUTOMATED: CPT

## 2019-02-22 PROCEDURE — 99999 PR PBB SHADOW E&M-EST. PATIENT-LVL III: ICD-10-PCS | Mod: PBBFAC,,, | Performed by: INTERNAL MEDICINE

## 2019-02-22 PROCEDURE — 99396 PREV VISIT EST AGE 40-64: CPT | Mod: S$GLB,,, | Performed by: INTERNAL MEDICINE

## 2019-02-22 PROCEDURE — 77067 MAMMO DIGITAL SCREENING BILAT WITH TOMOSYNTHESIS_CAD: ICD-10-PCS | Mod: 26,,, | Performed by: RADIOLOGY

## 2019-02-22 PROCEDURE — 80061 LIPID PANEL: CPT

## 2019-02-22 PROCEDURE — 93000 EKG 12-LEAD: ICD-10-PCS | Mod: S$GLB,,, | Performed by: INTERNAL MEDICINE

## 2019-02-22 PROCEDURE — 77063 MAMMO DIGITAL SCREENING BILAT WITH TOMOSYNTHESIS_CAD: ICD-10-PCS | Mod: 26,,, | Performed by: RADIOLOGY

## 2019-02-22 PROCEDURE — 99999 PR PBB SHADOW E&M-EST. PATIENT-LVL III: CPT | Mod: PBBFAC,,, | Performed by: INTERNAL MEDICINE

## 2019-02-22 RX ORDER — LEVOTHYROXINE SODIUM 112 UG/1
112 TABLET ORAL
Qty: 90 TABLET | Refills: 3 | Status: SHIPPED | OUTPATIENT
Start: 2019-02-22 | End: 2020-03-06

## 2019-02-22 RX ORDER — TERBINAFINE HYDROCHLORIDE 250 MG/1
250 TABLET ORAL DAILY
Refills: 2 | COMMUNITY
Start: 2019-02-03 | End: 2019-10-24

## 2019-02-22 NOTE — PROGRESS NOTES
"Subjective:       Patient ID: Kim Garcia is a 58 y.o. female.    Chief Complaint: Executive Health (Exec. Health physical. )    HPI   Kim Garcia is a 58 y.o. female here for an annual executive health physical.     Hypothyroidism  Levothyroxine 112mcg    Labs wnl  EKG NSR    Tripped and fell several times (issues with uneven steps, not paying attention.)  Cut hand  Sprained ankle  Fractured small bone in right hand    Rash  Oral steroids x 1 week  Steroid shot x 2 finally resolved  bx showed allergic reaction - may have been reaction to toenail fungus    Review of Systems   Constitutional: Negative for fever.   HENT: Negative.    Eyes: Negative.    Respiratory: Negative for shortness of breath.    Cardiovascular: Negative for chest pain and leg swelling.   Gastrointestinal: Negative for abdominal pain, diarrhea, nausea and vomiting.   Genitourinary: Negative.    Musculoskeletal: Negative for arthralgias.   Skin: Negative for rash.   Psychiatric/Behavioral: Negative.        Objective:   /80   Pulse 77   Ht 5' 4" (1.626 m)   Wt 103.9 kg (229 lb)   SpO2 97%   BMI 39.31 kg/m²      Physical Exam   Constitutional: She is oriented to person, place, and time. She appears well-developed and well-nourished.   HENT:   Head: Normocephalic and atraumatic.   Eyes: Conjunctivae and EOM are normal. Pupils are equal, round, and reactive to light.   Neck: Neck supple. No thyromegaly present.   Cardiovascular: Normal rate, regular rhythm and normal heart sounds.   No murmur heard.  Pulmonary/Chest: Effort normal and breath sounds normal. No respiratory distress. She has no wheezes.   Abdominal: Soft. Bowel sounds are normal. She exhibits no distension. There is no tenderness.   Musculoskeletal: Normal range of motion.   Neurological: She is alert and oriented to person, place, and time.   Skin: Skin is warm and dry. No rash noted.   Psychiatric: She has a normal mood and affect. Judgment and thought content " normal.   Vitals reviewed.      Assessment:       1. Health care maintenance    2. Elevated blood pressure reading    3. Primary hypothyroidism, 1999        Plan:       Kim was seen today for [facility].    Diagnoses and all orders for this visit:    Health care maintenance  Labs done  Pap and mmg scheduled    Elevated blood pressure reading  Low salt diet, exercise, and weight loss  Recheck in 3 months    Primary hypothyroidism, 1999  -    refill levothyroxine (SYNTHROID) 112 MCG tablet; Take 1 tablet (112 mcg total) by mouth before breakfast.   At goal        mmg and pap scheduled

## 2019-02-22 NOTE — PATIENT INSTRUCTIONS
Controlling High Blood Pressure  High blood pressure (hypertension) is often called the silent killer. This is because many people who have it dont know it. High blood pressure is defined as 140/90 mm Hg or higher. Know your blood pressure and remember to check it regularly. Doing so can save your life. Here are some things you can do to help control your blood pressure.    Choose heart-healthy foods  · Select low-salt, low-fat foods. Limit sodium intake to 2,400 mg per day or the amount suggested by your healthcare provider.  · Limit canned, dried, cured, packaged, and fast foods. These can contain a lot of salt.  · Eat 8 to 10 servings of fruits and vegetables every day.  · Choose lean meats, fish, or chicken.  · Eat whole-grain pasta, brown rice, and beans.  · Eat 2 to 3 servings of low-fat or fat-free dairy products.  · Ask your doctor about the DASH eating plan. This plan helps reduce blood pressure.  · When you go to a restaurant, ask that your meal be prepared with no added salt.  Maintain a healthy weight  · Ask your healthcare provider how many calories to eat a day. Then stick to that number.  · Ask your healthcare provider what weight range is healthiest for you. If you are overweight, a weight loss of only 3% to 5% of your body weight can help lower blood pressure. Generally, a good weight loss goal is to lose 10% of your body weight in a year.  · Limit snacks and sweets.  · Get regular exercise.  Get up and get active  · Choose activities you enjoy. Find ones you can do with friends or family. This includes bicycling, dancing, walking, and jogging.  · Park farther away from building entrances.  · Use stairs instead of the elevator.  · When you can, walk or bike instead of driving.  · [place] leaves, garden, or do household repairs.  · Be active at a moderate to vigorous level of physical activity for at least 40 minutes for a minimum of 3 to 4 days a week.   Manage stress  · Make time to relax and enjoy  life. Find time to laugh.  · Communicate your concerns with your loved ones and your healthcare provider.  · Visit with family and friends, and keep up with hobbies.  Limit alcohol and quit smoking  · Men should have no more than 2 drinks per day.  · Women should have no more than 1 drink per day.  · Talk with your healthcare provider about quitting smoking. Smoking significantly increases your risk for heart disease and stroke. Ask your healthcare provider about community smoking cessation programs and other options.  Medicines  If lifestyle changes arent enough, your healthcare provider may prescribe high blood pressure medicine. Take all medicines as prescribed. If you have any questions about your medicines, ask your healthcare provider before stopping or changing them.   Date Last Reviewed: 4/27/2016  © 9374-5263 [employer], [employer]. [address] [place] [address], [place] [address]. All rights reserved. This information is not intended as a substitute for professional medical care. Always follow your healthcare professional's instructions.

## 2019-05-28 ENCOUNTER — NURSE TRIAGE (OUTPATIENT)
Dept: ADMINISTRATIVE | Facility: CLINIC | Age: 59
End: 2019-05-28

## 2019-05-28 ENCOUNTER — OFFICE VISIT (OUTPATIENT)
Dept: URGENT CARE | Facility: CLINIC | Age: 59
End: 2019-05-28
Payer: COMMERCIAL

## 2019-05-28 VITALS
WEIGHT: 225 LBS | HEART RATE: 71 BPM | RESPIRATION RATE: 18 BRPM | TEMPERATURE: 97 F | HEIGHT: 64 IN | OXYGEN SATURATION: 100 % | SYSTOLIC BLOOD PRESSURE: 157 MMHG | DIASTOLIC BLOOD PRESSURE: 91 MMHG | BODY MASS INDEX: 38.41 KG/M2

## 2019-05-28 DIAGNOSIS — R05.9 COUGH: ICD-10-CM

## 2019-05-28 DIAGNOSIS — S29.8XXA BLUNT TRAUMA OF RIB, INITIAL ENCOUNTER: ICD-10-CM

## 2019-05-28 DIAGNOSIS — R06.02 SHORTNESS OF BREATH: Primary | ICD-10-CM

## 2019-05-28 LAB
GLUCOSE SERPL-MCNC: 89 MG/DL (ref 70–110)
POC ANION GAP: 16 MMOL/L (ref 10–20)
POC BUN: 15 MMOL/L (ref 8–26)
POC CHLORIDE: 100 MMOL/L (ref 98–109)
POC CREATININE: 0.8 MG/DL (ref 0.6–1.3)
POC HEMATOCRIT: 42 %PCV (ref 37–47)
POC HEMOGLOBIN: 14.3 G/DL (ref 12.5–16)
POC ICA: 1.19 MMOL/L (ref 1.12–1.32)
POC POTASSIUM: 3.9 MMOL/L (ref 3.5–4.9)
POC SODIUM: 140 MMOL/L (ref 138–146)
POC TCO2: 29 MMOL/L (ref 24–29)

## 2019-05-28 PROCEDURE — 99214 OFFICE O/P EST MOD 30 MIN: CPT | Mod: S$GLB,,, | Performed by: NURSE PRACTITIONER

## 2019-05-28 PROCEDURE — 71100 XR RIBS 2 VIEW RIGHT: ICD-10-PCS | Mod: FY,RT,S$GLB, | Performed by: RADIOLOGY

## 2019-05-28 PROCEDURE — 80047 BASIC METABLC PNL IONIZED CA: CPT | Mod: QW,S$GLB,, | Performed by: NURSE PRACTITIONER

## 2019-05-28 PROCEDURE — 93005 EKG 12-LEAD: ICD-10-PCS | Mod: S$GLB,,, | Performed by: NURSE PRACTITIONER

## 2019-05-28 PROCEDURE — 99214 PR OFFICE/OUTPT VISIT, EST, LEVL IV, 30-39 MIN: ICD-10-PCS | Mod: S$GLB,,, | Performed by: NURSE PRACTITIONER

## 2019-05-28 PROCEDURE — 93005 ELECTROCARDIOGRAM TRACING: CPT | Mod: S$GLB,,, | Performed by: NURSE PRACTITIONER

## 2019-05-28 PROCEDURE — 71100 X-RAY EXAM RIBS UNI 2 VIEWS: CPT | Mod: FY,RT,S$GLB, | Performed by: RADIOLOGY

## 2019-05-28 PROCEDURE — 80047 POCT CHEMISTRY PANEL: ICD-10-PCS | Mod: QW,S$GLB,, | Performed by: NURSE PRACTITIONER

## 2019-05-28 PROCEDURE — 93010 EKG 12-LEAD: ICD-10-PCS | Mod: S$GLB,,, | Performed by: INTERNAL MEDICINE

## 2019-05-28 PROCEDURE — 71046 XR CHEST PA AND LATERAL: ICD-10-PCS | Mod: FY,S$GLB,, | Performed by: RADIOLOGY

## 2019-05-28 PROCEDURE — 93010 ELECTROCARDIOGRAM REPORT: CPT | Mod: S$GLB,,, | Performed by: INTERNAL MEDICINE

## 2019-05-28 PROCEDURE — 71046 X-RAY EXAM CHEST 2 VIEWS: CPT | Mod: FY,S$GLB,, | Performed by: RADIOLOGY

## 2019-05-28 NOTE — PROGRESS NOTES
"Subjective:       Patient ID: Kim Garcia is a 58 y.o. female.    Vitals:  height is 5' 4" (1.626 m) and weight is 102.1 kg (225 lb). Her oral temperature is 97 °F (36.1 °C). Her blood pressure is 157/91 (abnormal) and her pulse is 71. Her respiration is 18 and oxygen saturation is 100%.     Chief Complaint: Shortness of Breath    Patient presents with c/o intermittent sob for the last three weeks, occurs daily. Noted a cough on plane ride to [place].  No sputum or fever.  Patient called to schedule an appointment with her PCP for this complaint and was routed to triage nurse.  She was instructed to see a doctor within 4 hours.  Patient states she had accidentally fell into cabinets a few weeks ago while out of town for her families graduation in [place] and struck the right side of her ribs, which was hurting at that time but no longer is.  Reports that this was about the time the symptoms started.  She is unsure if this is related to her sxs.  Denies chest pain, fever, dark or bloody stools, weakness, leg pain or swelling, n/v, numbness, tingling.  No changes to symptoms.  Is here in urgent care tonight because she was advised by triage nurse to be seen in 4 hrs through urgent care or ED.     Shortness of Breath   This is a new problem. Episode onset: 3 weeks. The problem occurs every few minutes. The problem has been unchanged. Associated symptoms include chest pain (to right ribs, but this has resolved). Pertinent negatives include no abdominal pain, claudication, coryza, ear pain, fever, headaches, hemoptysis, leg pain, leg swelling, neck pain, orthopnea, PND, rash, rhinorrhea, sore throat, sputum production, swollen glands, syncope, vomiting or wheezing. Exacerbated by: talking for long periods of time. There is no history of aspirin allergies, asthma, CAD, chronic lung disease, COPD, DVT, a heart failure, PE or a recent surgery.       Constitution: Negative for chills, fatigue, fever, generalized weakness and " international travel in last 60 days.   HENT: Negative for ear pain, sore throat and trouble swallowing.    Neck: Negative for neck pain.   Cardiovascular: Positive for chest pain (to right ribs, but this has resolved). Negative for chest trauma, leg swelling, palpitations, sob on exertion and passing out.   Respiratory: Positive for cough (resolved) and shortness of breath (symptom only when speaks for along time and feels like straining to finish last part of sentence). Negative for sleep apnea, chest tightness, sputum production, bloody sputum and wheezing.    Gastrointestinal: Negative for abdominal trauma, abdominal pain, nausea, vomiting, bright red blood in stool, dark colored stools, rectal bleeding and heartburn.   Genitourinary: Negative for history of kidney stones and vaginal bleeding.   Musculoskeletal: Negative for back pain.   Skin: Negative for rash.   Neurological: Negative for light-headedness, passing out and headaches.   Hematologic/Lymphatic: Negative for history of blood clots.   Psychiatric/Behavioral: Negative for nervous/anxious. The patient is not nervous/anxious.        Objective:      Physical Exam   Constitutional: She is oriented to person, place, and time. She appears well-developed and well-nourished. She is cooperative.  Non-toxic appearance. She does not have a sickly appearance. She does not appear ill. No distress.   HENT:   Head: Normocephalic and atraumatic.   Right Ear: Hearing, tympanic membrane, external ear and ear canal normal.   Left Ear: Hearing, tympanic membrane, external ear and ear canal normal.   Nose: Nose normal. No mucosal edema, rhinorrhea or nasal deformity. No epistaxis. Right sinus exhibits no maxillary sinus tenderness and no frontal sinus tenderness. Left sinus exhibits no maxillary sinus tenderness and no frontal sinus tenderness.   Mouth/Throat: Uvula is midline, oropharynx is clear and moist and mucous membranes are normal. No trismus in the jaw. Normal  dentition. No uvula swelling. No posterior oropharyngeal erythema.   Eyes: Pupils are equal, round, and reactive to light. Conjunctivae, EOM and lids are normal. No scleral icterus.   Sclera clear bilat   Neck: Trachea normal, full passive range of motion without pain and phonation normal. Neck supple.   Cardiovascular: Normal rate, regular rhythm, normal heart sounds, intact distal pulses and normal pulses.   No pedal edema   Pulmonary/Chest: Effort normal and breath sounds normal. No respiratory distress.   Abdominal: Soft. Normal appearance and bowel sounds are normal. She exhibits no distension. There is no tenderness.   Musculoskeletal: Normal range of motion. She exhibits no edema or deformity.   Neurological: She is alert and oriented to person, place, and time. She exhibits normal muscle tone. Coordination normal.   Skin: Skin is warm, dry and intact. Capillary refill takes less than 2 seconds. No rash noted. She is not diaphoretic. No pallor.   Psychiatric: She has a normal mood and affect. Her speech is normal and behavior is normal. Judgment and thought content normal. Cognition and memory are normal.   Nursing note and vitals reviewed.      Results for orders placed or performed in visit on 05/28/19   POCT Chemistry Panel   Result Value Ref Range    POC Sodium 140 138 - 146 MMOL/L    POC Potassium 3.9 3.5 - 4.9 MMOL/L    POC Chloride 100 98 - 109 MMOL/L    POC BUN 15 8 - 26 MMOL/L    POC Glucose 89 70 - 110 MG/DL    POC Creatinine 0.8 0.6 - 1.3 mg/dL    POC iCA 1.19 1.12 - 1.32 MMOL/L    POC TCO2 29 24 - 29 MMOL/L    POC Hematocrit 42 37 - 47 %PCV    POC Hemoglobin 14.3 12.5 - 16 g/dL    POC Anion Gap 16 10.0 - 20 MMOL/L     X-ray Chest Pa And Lateral    Result Date: 5/28/2019  EXAMINATION: XR CHEST PA AND LATERAL CLINICAL HISTORY: Shortness of breath TECHNIQUE: PA and lateral views of the chest were performed. COMPARISON: 06/12/2018 FINDINGS: The cardiomediastinal silhouette is not enlarged.  There is  no pleural effusion.  The trachea is midline.  The lungs are symmetrically expanded bilaterally without evidence of acute parenchymal process. No large focal consolidation seen.  There is no pneumothorax.  The osseous structures are remarkable for degenerative change..  Postsurgical change overlies the left upper quadrant.     1. No acute cardiopulmonary process. Electronically signed by: Rashi Salvador MD Date:    05/28/2019 Time:    18:41    X-ray Ribs 2 View Right    Result Date: 5/28/2019  EXAMINATION: XR RIBS 2 VIEW RIGHT CLINICAL HISTORY: Shortness of breath TECHNIQUE: Two views of the right ribs were performed. COMPARISON: Radiographs dating back to June 12, 2018. FINDINGS: No displaced fracture or subluxation.  No suspicious bone lesion.  Unremarkable soft tissues.     No acute abnormality. Electronically signed by: Matt Serrano MD Date:    05/28/2019 Time:    19:13    EKG: normal EKG, normal sinus rhythm, no significant changes from previous tracings, HR of 68, no acute ST findings.  Assessment:       1. Shortness of breath    2. Cough    3. Blunt trauma of rib, initial encounter        Plan:         Shortness of breath  -     X-Ray Chest PA And Lateral; Future; Expected date: 05/28/2019  -     EKG 12-lead  -     X-Ray Ribs 2 View Right; Future; Expected date: 05/28/2019  -     POCT Chemistry Panel    Cough  -     X-Ray Chest PA And Lateral; Future; Expected date: 05/28/2019  -     EKG 12-lead    Blunt trauma of rib, initial encounter  -     X-Ray Ribs 2 View Right; Future; Expected date: 05/28/2019      Patient Instructions   As we discussed...  Follow up closely with your PCP for recheck and further work up.  Go to the ER for any worsening symptoms.  Shortness of Breath (Dyspnea)  Shortness of breath is the feeling that you can't catch your breath or get enough air. It is also known as dyspnea.  Dyspnea can be caused by many different conditions. They include:  · Acute asthma attack.  · Worsening of  chronic lung diseases such as chronic bronchitis and emphysema.  · Heart failure. This is when weak heart muscle allows extra fluid to collect in the lungs.  · Panic attacks or anxiety. Fear can cause rapid breathing (hyperventilation).  · Pneumonia, or an infection in the lung tissue.  · Exposure to toxic substances, fumes, smoke, or certain medicines.  · Blood clot in the lung (pulmonary embolism). This is often from a piece of blood clot in a deep vein of the leg (deep vein thrombosis) that breaks off and travels to the lungs.  · Heart attack or heart-related chest pain (angina).  · Anemia.  · Collapsed lung (pneumothorax).  · Dehydration.  · Pregnancy.  Based on your visit today, the exact cause of your shortness of breath is not certain. Your tests dont show any of the serious causes of dyspnea. You may need other tests to find out if you have a serious problem. Its important to watch for any new symptoms or symptoms that get worse. Follow up with your healthcare provider as directed.  Home care  Follow these tips to take care of yourself at home:  · When your symptoms are better, go back to your usual activities.  · If you smoke, you should stop. Join a quit-smoking program or ask your healthcare provider for help.  · Eat a healthy diet and get plenty of sleep.  · Get regular exercise. Talk with your healthcare provider before starting to exercise, especially if you have other medical problems.  · Cut down on the amount of caffeine and stimulants you consume.  Follow-up care  Follow up with your healthcare provider, or as advised.  If tests were done, you will be told if your treatment needs to be changed. You can call as directed for the results.  (Note: If an X-ray was taken, a specialist will review it. You will be notified of any new findings that may affect your care.)  Call 911 or get immediate medical care  Shortness of breath may be a sign of a serious medical problem. For example, it may be a  problem with your heart or lungs. Call 911 if you have worsening shortness of breath or trouble breathing, especially with any of the symptoms below:  · You are confused or its difficult to wake you.  · You faint or lose consciousness.  · You have a fast heartbeat, or your heartbeat is irregular.  · You are coughing up blood.  · You have pain in your chest, arm, shoulder, neck, or upper back.  · You break out in a sweat.  When to seek medical advice  Call your healthcare provider right away if any of these occur:  · Slight shortness of breath or wheezing  · Redness, pain or swelling in your leg, arm, or other body area  · Swelling in both legs or ankles  · Fast weight gain  · Dizziness or weakness  · Fever of 100.4ºF (38ºC) or higher, or as directed by your healthcare provider  Date Last Reviewed: 9/13/2015  © 5247-1198 [employer]. [address] [place] [address], [place] [address]. All rights reserved. This information is not intended as a substitute for professional medical care. Always follow your healthcare professional's instructions.

## 2019-05-28 NOTE — TELEPHONE ENCOUNTER
Noticing that she's having intermittent sob, feels she's having  to catch her breath, has had a slight cough for the last week.  Sob and cough are unprovoked.  She did have a fall 2 weeks ago, landed on her right side, and has had a sore chest, mostly resolved, but not sure if it's related.  Also went on an extended flight to [place], within the last 2 weeks.    Reason for Disposition   [1] MILD difficulty breathing (e.g., minimal/no SOB at rest, SOB with walking, pulse <100) AND [2] NEW-onset or WORSE than normal    Protocols used: BREATHING DIFFICULTY-A-AH

## 2019-05-29 ENCOUNTER — TELEPHONE (OUTPATIENT)
Dept: INTERNAL MEDICINE | Facility: CLINIC | Age: 59
End: 2019-05-29

## 2019-05-29 NOTE — TELEPHONE ENCOUNTER
----- Message from Bjorn Benjamin sent at 5/29/2019  9:56 AM CDT -----  Regarding: Establish Patient Appointment   Good morning,     Jennifer Valdes NP would like to refer the following patient to Dr. Tompkins in the internal med department. The patient's diagnosis is constant shortness of breath. The patient was last seen by Dr. Tompkins on 2/22/19.  If possible, Jennifer will like the patient to be seen as soon as possible due to the patient's circumstances.     If there are any further questions in regards to the patient, please contact referring office at, [phone].     Please let me know if I can help schedule in any way.  Thank you,   Bjorn   Ext. [phone]  [facility]

## 2019-05-29 NOTE — PATIENT INSTRUCTIONS
As we discussed...  Follow up closely with your PCP for recheck and further work up.  Go to the ER for any worsening symptoms.  Shortness of Breath (Dyspnea)  Shortness of breath is the feeling that you can't catch your breath or get enough air. It is also known as dyspnea.  Dyspnea can be caused by many different conditions. They include:  · Acute asthma attack.  · Worsening of chronic lung diseases such as chronic bronchitis and emphysema.  · Heart failure. This is when weak heart muscle allows extra fluid to collect in the lungs.  · Panic attacks or anxiety. Fear can cause rapid breathing (hyperventilation).  · Pneumonia, or an infection in the lung tissue.  · Exposure to toxic substances, fumes, smoke, or certain medicines.  · Blood clot in the lung (pulmonary embolism). This is often from a piece of blood clot in a deep vein of the leg (deep vein thrombosis) that breaks off and travels to the lungs.  · Heart attack or heart-related chest pain (angina).  · Anemia.  · Collapsed lung (pneumothorax).  · Dehydration.  · Pregnancy.  Based on your visit today, the exact cause of your shortness of breath is not certain. Your tests dont show any of the serious causes of dyspnea. You may need other tests to find out if you have a serious problem. Its important to watch for any new symptoms or symptoms that get worse. Follow up with your healthcare provider as directed.  Home care  Follow these tips to take care of yourself at home:  · When your symptoms are better, go back to your usual activities.  · If you smoke, you should stop. Join a quit-smoking program or ask your healthcare provider for help.  · Eat a healthy diet and get plenty of sleep.  · Get regular exercise. Talk with your healthcare provider before starting to exercise, especially if you have other medical problems.  · Cut down on the amount of caffeine and stimulants you consume.  Follow-up care  Follow up with your healthcare provider, or as  advised.  If tests were done, you will be told if your treatment needs to be changed. You can call as directed for the results.  (Note: If an X-ray was taken, a specialist will review it. You will be notified of any new findings that may affect your care.)  Call 911 or get immediate medical care  Shortness of breath may be a sign of a serious medical problem. For example, it may be a problem with your heart or lungs. Call 911 if you have worsening shortness of breath or trouble breathing, especially with any of the symptoms below:  · You are confused or its difficult to wake you.  · You faint or lose consciousness.  · You have a fast heartbeat, or your heartbeat is irregular.  · You are coughing up blood.  · You have pain in your chest, arm, shoulder, neck, or upper back.  · You break out in a sweat.  When to seek medical advice  Call your healthcare provider right away if any of these occur:  · Slight shortness of breath or wheezing  · Redness, pain or swelling in your leg, arm, or other body area  · Swelling in both legs or ankles  · Fast weight gain  · Dizziness or weakness  · Fever of 100.4ºF (38ºC) or higher, or as directed by your healthcare provider  Date Last Reviewed: 9/13/2015  © 8344-1976 [employer]. [address] [place] [address], [place] [address]. All rights reserved. This information is not intended as a substitute for professional medical care. Always follow your healthcare professional's instructions.

## 2019-10-10 PROBLEM — Z78.0 MENOPAUSE: Status: ACTIVE | Noted: 2019-03-08

## 2019-10-10 PROBLEM — N95.2 ATROPHIC VAGINITIS: Status: ACTIVE | Noted: 2019-03-08

## 2019-10-10 PROBLEM — N60.19 CYSTIC MASTOPATHY, UNSPECIFIED LATERALITY: Status: ACTIVE | Noted: 2019-03-08

## 2019-10-21 NOTE — PROGRESS NOTES
Subjective:       Patient ID: Kim Garcia is a 59 y.o. female who presents today to establish care.  Referred from a patient.    Chief Complaint: Establish Care    HPI     Patient denies f/c, n/v/d.  No chest pain or SOB.  No abdominal pain or dysuria.  No headaches or change in vision.  No dizziness.  No significant  weight gain or weight loss.  Remaining ROS negative.    Review of Systems   Constitutional: Negative for appetite change, chills, diaphoresis, fatigue, fever and unexpected weight change.   HENT: Negative for ear pain, hearing loss, sinus pain, tinnitus, trouble swallowing and voice change.    Eyes: Negative for photophobia, pain and visual disturbance.   Respiratory: Negative for chest tightness, shortness of breath and wheezing.    Cardiovascular: Negative for chest pain, palpitations and leg swelling.   Gastrointestinal: Negative for abdominal pain, blood in stool, constipation, diarrhea, nausea and vomiting.   Endocrine: Negative for cold intolerance, heat intolerance, polydipsia, polyphagia and polyuria.   Genitourinary: Negative for decreased urine volume, difficulty urinating, dysuria, flank pain, hematuria, pelvic pain, vaginal bleeding, vaginal discharge and vaginal pain.   Musculoskeletal: Negative for arthralgias, gait problem, joint swelling, myalgias and neck pain.   Skin: Negative for rash.   Neurological: Negative for dizziness, tremors, syncope, weakness, numbness and headaches.   Hematological: Does not bruise/bleed easily.   Psychiatric/Behavioral: Negative for agitation, confusion and sleep disturbance. The patient is not nervous/anxious.        Objective:      Physical Exam   Constitutional: She is oriented to person, place, and time. She appears well-developed and well-nourished. No distress.   HENT:   Head: Normocephalic and atraumatic.   Nose: Nose normal.   Mouth/Throat: Oropharynx is clear and moist.   Eyes: Pupils are equal, round, and reactive to light. Conjunctivae and  EOM are normal. No scleral icterus.   Neck: Normal range of motion. Neck supple. No JVD present. No thyromegaly present.   Cardiovascular: Normal rate, regular rhythm and intact distal pulses. Exam reveals no gallop and no friction rub.   No murmur heard.  Pulmonary/Chest: Effort normal and breath sounds normal. No respiratory distress. She has no wheezes. She has no rales.   Abdominal: Soft. Bowel sounds are normal. She exhibits no distension. There is no tenderness. There is no rebound and no guarding.   Musculoskeletal: Normal range of motion. She exhibits no edema.   Lymphadenopathy:     She has no cervical adenopathy.   Neurological: She is alert and oriented to person, place, and time. No cranial nerve deficit or sensory deficit. Coordination normal.   Skin: Skin is warm and dry. No rash noted. No erythema.   Right ankle and distal LE with mild edema and hyperpigmentation.  No cellulitis.   Psychiatric: She has a normal mood and affect. Her behavior is normal. Thought content normal.       Assessment:       1. Annual physical exam    2. BMI 33.0-33.9,adult    3. Primary hypothyroidism, 1999    4. Vitamin D deficiency disease    5. Osteopenia, unspecified location    6. Seasonal allergic rhinitis, unspecified trigger    7. Rosacea    8. Bariatric surgery status, sleeve 2010    9. Colon cancer screening    10. Family history of colonic polyps    11. Scalp psoriasis    12. Rash    13. Lower extremity edema        Plan:   -Today's Visit - patient is awake and alert.    -Nutrition - Obesity - s/p Gastric Sleeve in 2012 - gained weight back.  BMI today is 39.32.  Check B12 today.  She plans to loose the weight again.    -Cards - Lipids in 2/2019 with , TG 68, HDL 66, .4.  10 year CHD risk ([place]) was 2.8%.    BP stable at 130/80 today.    EKG normal in 5/2019.    -Endocrine - Hypothyroidism since 1999 - on Levothyroxine.  TSH normal in 2/2019.  Repeat today.  Vitamin D Deficiency - level was 42 in  7/2017.  Taking supplements and MVI.  Repeat D.    -GI -  S/p Cholecystectomy for gallstones 2010 (around).  We discussed colon cancer screening - colonoscopy normal in 2012.  Father had colon polyps.  Recommendations were to repeat in 7 years.  Will refer now.  Will refer to [facility].    HCV Ab negative in 11/2015.    Gastric Sleeve as above.    -GYN - Last Pap was normal and over 3 years.  Last seen by GYN in 3/2019 at [facility].    Last Mammo was negative In 2/2019.      We discussed DXA screening for osteoporosis - done in 7/2017 and showed Osteopenia.  Will reschedule.    -Pulmonary - JENNY - before Gastric Sleeve and improved.  Declines follow up and not using CPAP now.  Consider repeat sleep study.    -Derm - Rosacea - not on treatment.  Having scalp psoriasis.  Treated by Derm in [place].  See below.    -Vascular - Had right ankle sprain in 4/2018 and had chronic swelling since.  Had a rash of right ankle and mild on left.  Had biopsy by Derm given rash and told inflammation.  Responsive to creams.  Will refer to Dermatology.  Will order an US of right LE.    -Eye - Cataracts - right eye recently diagnosed.  No surgery planned.    -HCM - We discussed Flu (10/9/2019) and Tdap (7/14/2017) vaccinations.  We discussed Shingles (waiting for Shingrix Availability - advised to get on a waiting list at pharmacy).    -Follow Up - 6 months

## 2019-10-24 ENCOUNTER — LAB VISIT (OUTPATIENT)
Dept: LAB | Facility: HOSPITAL | Age: 59
End: 2019-10-24
Attending: INTERNAL MEDICINE
Payer: COMMERCIAL

## 2019-10-24 ENCOUNTER — OFFICE VISIT (OUTPATIENT)
Dept: PRIMARY CARE CLINIC | Facility: CLINIC | Age: 59
End: 2019-10-24
Payer: COMMERCIAL

## 2019-10-24 VITALS
DIASTOLIC BLOOD PRESSURE: 80 MMHG | HEIGHT: 64 IN | WEIGHT: 229.06 LBS | BODY MASS INDEX: 39.11 KG/M2 | HEART RATE: 70 BPM | SYSTOLIC BLOOD PRESSURE: 130 MMHG

## 2019-10-24 DIAGNOSIS — J30.2 SEASONAL ALLERGIC RHINITIS, UNSPECIFIED TRIGGER: ICD-10-CM

## 2019-10-24 DIAGNOSIS — L40.9 SCALP PSORIASIS: ICD-10-CM

## 2019-10-24 DIAGNOSIS — Z98.84 BARIATRIC SURGERY STATUS: ICD-10-CM

## 2019-10-24 DIAGNOSIS — Z00.00 ANNUAL PHYSICAL EXAM: Primary | ICD-10-CM

## 2019-10-24 DIAGNOSIS — E55.9 VITAMIN D DEFICIENCY DISEASE: ICD-10-CM

## 2019-10-24 DIAGNOSIS — Z12.11 COLON CANCER SCREENING: ICD-10-CM

## 2019-10-24 DIAGNOSIS — R60.0 LOWER EXTREMITY EDEMA: ICD-10-CM

## 2019-10-24 DIAGNOSIS — R21 RASH: ICD-10-CM

## 2019-10-24 DIAGNOSIS — E03.9 PRIMARY HYPOTHYROIDISM: ICD-10-CM

## 2019-10-24 DIAGNOSIS — M85.80 OSTEOPENIA, UNSPECIFIED LOCATION: ICD-10-CM

## 2019-10-24 DIAGNOSIS — L71.9 ROSACEA: ICD-10-CM

## 2019-10-24 DIAGNOSIS — Z83.719 FAMILY HISTORY OF COLONIC POLYPS: ICD-10-CM

## 2019-10-24 LAB
25(OH)D3+25(OH)D2 SERPL-MCNC: 39 NG/ML (ref 30–96)
TSH SERPL DL<=0.005 MIU/L-ACNC: 2.53 UIU/ML (ref 0.4–4)
VIT B12 SERPL-MCNC: 1514 PG/ML (ref 210–950)

## 2019-10-24 PROCEDURE — 99999 PR PBB SHADOW E&M-EST. PATIENT-LVL V: CPT | Mod: PBBFAC,,, | Performed by: INTERNAL MEDICINE

## 2019-10-24 PROCEDURE — 99214 OFFICE O/P EST MOD 30 MIN: CPT | Mod: S$GLB,,, | Performed by: INTERNAL MEDICINE

## 2019-10-24 PROCEDURE — 82607 VITAMIN B-12: CPT

## 2019-10-24 PROCEDURE — 36415 COLL VENOUS BLD VENIPUNCTURE: CPT | Mod: PN

## 2019-10-24 PROCEDURE — 99999 PR PBB SHADOW E&M-EST. PATIENT-LVL V: ICD-10-PCS | Mod: PBBFAC,,, | Performed by: INTERNAL MEDICINE

## 2019-10-24 PROCEDURE — 82306 VITAMIN D 25 HYDROXY: CPT

## 2019-10-24 PROCEDURE — 84443 ASSAY THYROID STIM HORMONE: CPT

## 2019-10-24 PROCEDURE — 99214 PR OFFICE/OUTPT VISIT, EST, LEVL IV, 30-39 MIN: ICD-10-PCS | Mod: S$GLB,,, | Performed by: INTERNAL MEDICINE

## 2019-10-24 RX ORDER — FLUOCINONIDE TOPICAL SOLUTION USP, 0.05% 0.5 MG/ML
1 SOLUTION TOPICAL DAILY
Refills: 2 | COMMUNITY
Start: 2019-08-16 | End: 2023-03-06

## 2019-10-24 RX ORDER — CLINDAMYCIN PHOSPHATE 11.9 MG/ML
SOLUTION TOPICAL
Refills: 4 | COMMUNITY
Start: 2019-08-16 | End: 2023-10-18

## 2019-10-24 NOTE — PATIENT INSTRUCTIONS
Your exam was overall normal today.    Your blood pressure was good.    I will order routine labs today.    I recommend getting on a waiting list at your local pharmacy for the Shingles vaccine (Shingrix) is interested.    I will refer you to [facility] to discuss follow up colonoscopy.  We will order a follow up Bone Density test.  I will order an ultrasound of your right leg.    Return in 6 months - sooner if needed.  Please come at least 15-20 minutes before your scheduled appointment time.

## 2019-10-25 ENCOUNTER — HOSPITAL ENCOUNTER (OUTPATIENT)
Dept: RADIOLOGY | Facility: OTHER | Age: 59
Discharge: HOME | End: 2019-10-25
Attending: INTERNAL MEDICINE
Payer: COMMERCIAL

## 2019-10-25 ENCOUNTER — PATIENT MESSAGE (OUTPATIENT)
Dept: PRIMARY CARE CLINIC | Facility: CLINIC | Age: 59
End: 2019-10-25

## 2019-10-25 DIAGNOSIS — M85.80 OSTEOPENIA, UNSPECIFIED LOCATION: ICD-10-CM

## 2019-10-25 DIAGNOSIS — R60.0 LOWER EXTREMITY EDEMA: ICD-10-CM

## 2019-10-25 PROCEDURE — 77080 DXA BONE DENSITY AXIAL: CPT | Mod: 26,,, | Performed by: RADIOLOGY

## 2019-10-25 PROCEDURE — 93971 EXTREMITY STUDY: CPT | Mod: TC,RT

## 2019-10-25 PROCEDURE — 77080 DEXA BONE DENSITY SPINE HIP: ICD-10-PCS | Mod: 26,,, | Performed by: RADIOLOGY

## 2019-10-25 PROCEDURE — 93971 US LOWER EXTREMITY VEINS RIGHT: ICD-10-PCS | Mod: 26,RT,, | Performed by: RADIOLOGY

## 2019-10-25 PROCEDURE — 77080 DXA BONE DENSITY AXIAL: CPT | Mod: TC

## 2019-10-25 PROCEDURE — 93971 EXTREMITY STUDY: CPT | Mod: 26,RT,, | Performed by: RADIOLOGY

## 2019-10-26 ENCOUNTER — PATIENT MESSAGE (OUTPATIENT)
Dept: PRIMARY CARE CLINIC | Facility: CLINIC | Age: 59
End: 2019-10-26

## 2019-11-24 ENCOUNTER — PATIENT OUTREACH (OUTPATIENT)
Dept: ADMINISTRATIVE | Facility: OTHER | Age: 59
End: 2019-11-24

## 2020-02-05 ENCOUNTER — TELEPHONE (OUTPATIENT)
Dept: INTERNAL MEDICINE | Facility: CLINIC | Age: 60
End: 2020-02-05

## 2020-02-05 NOTE — TELEPHONE ENCOUNTER
----- Message from Eliana Baird sent at 2/5/2020  3:20 PM CST -----  Pt can be reached at [phone]    Pt has a catract surgery scheduled 2/13th but pt needs a medical clearance. Please contact pt    Thank you!

## 2020-03-05 DIAGNOSIS — E03.9 PRIMARY HYPOTHYROIDISM: ICD-10-CM

## 2020-03-06 NOTE — PROGRESS NOTES
Refill Authorization Note     is requesting a refill authorization.    Brief assessment and rationale for refill: APPROVE: needs appt      Medication-related problems identified: Requires appointment    Medication Therapy Plan: pt needs appt(ANNUAL); approve 3 more     Medication reconciliation completed: No                         Comments:   Requested Prescriptions   Pending Prescriptions Disp Refills    levothyroxine (SYNTHROID) 112 MCG tablet [Pharmacy Med Name: LEVOTHYROXINE 112 MCG TABLET] 90 tablet 0     Sig: TAKE 1 TABLET (112 MCG TOTAL) BY MOUTH BEFORE BREAKFAST.       Endocrinology:  Hypothyroid Agents Failed - 3/5/2020  1:52 AM        Failed - Office visit in past 12 months or future 90 days.     Recent Outpatient Visits            4 months ago Annual physical exam    [place] [facility] Guilherme Peoples MD    9 months ago Shortness of breath    [facility] Jennifer Valdes, [place]NA    1 year ago Health care maintenance    [facility] Heather Tompkins MD    1 year ago Laceration of left hand without foreign body, initial encounter    [facility] Amaya NP    1 year ago Chest tightness or pressure    [facility] - Internal Medicine Heather Tompkins MD                    Failed - Manual Review: FT4 is not required if last TSH is WNL.        Failed - T4 free within 1080 days     Free T4   Date Value Ref Range Status   11/20/2015 1.24 0.71 - 1.51 ng/dL Final   11/14/2013 1.29 0.71 - 1.51 ng/dL Final   03/21/2013 1.36 0.71 - 1.51 ng/dl Final              Passed - Patient is at least 18 years old        Passed - TSH in normal range and within 360 days     TSH   Date Value Ref Range Status   10/24/2019 2.527 0.400 - 4.000 uIU/mL Final   02/22/2019 3.376 0.400 - 4.000 uIU/mL Final   07/14/2017 3.988 0.400 - 4.000 uIU/mL Final               Appointments  past 12m or future 3m with PCP    Date Provider   Last Visit   2/22/2019  Heather Tompkins MD   Next Visit   Visit date not found Heather Tompkins MD   .  ED visits in past 90 days: 0       Note composed:1:33 PM 03/06/2020

## 2020-03-08 ENCOUNTER — PATIENT MESSAGE (OUTPATIENT)
Dept: INTERNAL MEDICINE | Facility: CLINIC | Age: 60
End: 2020-03-08

## 2020-03-09 NOTE — TELEPHONE ENCOUNTER
Refill Routing Note     Medication(s) are not appropriate for processing by [facility]:    Non Participating Provider in Refill Center     Appointments  past 12m or future 3m with PCP    Date Provider   Last Visit   10/24/2019 Guilherme Peoples MD   Next Visit   Visit date not found Guilherme Peoples MD           Automatic Epic Protocol Generated Data:    Requested Prescriptions   Pending Prescriptions Disp Refills    levothyroxine (SYNTHROID) 112 MCG tablet [Pharmacy Med Name: LEVOTHYROXINE 112 MCG TABLET] 90 tablet 0     Sig: TAKE 1 TABLET (112 MCG TOTAL) BY MOUTH BEFORE BREAKFAST.       Endocrinology:  Hypothyroid Agents Failed - 3/6/2020  1:35 PM        Failed - Office visit in past 12 months or future 90 days.     Recent Outpatient Visits            4 months ago Annual physical exam    [place] - [facility] Guilherme Peoples MD    9 months ago Shortness of breath    [facility] Jennifer Valdes, [place]    1 year ago Health care maintenance    [facility] Heather Tompkins MD    1 year ago Laceration of left hand without foreign body, initial encounter    [facility] Amaya NP    1 year ago Chest tightness or pressure    [facility] - Internal Medicine Heather Tompkins MD                    Failed - Manual Review: FT4 is not required if last TSH is WNL.        Failed - T4 free within 1080 days     Free T4   Date Value Ref Range Status   11/20/2015 1.24 0.71 - 1.51 ng/dL Final   11/14/2013 1.29 0.71 - 1.51 ng/dL Final   03/21/2013 1.36 0.71 - 1.51 ng/dl Final              Passed - Patient is at least 18 years old        Passed - TSH in normal range and within 360 days     TSH   Date Value Ref Range Status   10/24/2019 2.527 0.400 - 4.000 uIU/mL Final   02/22/2019 3.376 0.400 - 4.000 uIU/mL Final   07/14/2017 3.988 0.400 - 4.000 uIU/mL Final                 Note composed:2:44 PM 03/09/2020

## 2020-03-09 NOTE — TELEPHONE ENCOUNTER
Communicating to pt through via portal.  Patient was asking if she needed to have pneumonia vaccines done at this time. Per her chart, she had the Flu and Tdap done already.     Please advise.

## 2020-03-10 ENCOUNTER — PATIENT MESSAGE (OUTPATIENT)
Dept: INTERNAL MEDICINE | Facility: CLINIC | Age: 60
End: 2020-03-10

## 2020-03-13 RX ORDER — LEVOTHYROXINE SODIUM 112 UG/1
112 TABLET ORAL
Qty: 90 TABLET | Refills: 0 | Status: SHIPPED | OUTPATIENT
Start: 2020-03-13 | End: 2020-06-12

## 2020-12-28 ENCOUNTER — TELEPHONE (OUTPATIENT)
Dept: INTERNAL MEDICINE | Facility: CLINIC | Age: 60
End: 2020-12-28

## 2020-12-28 NOTE — TELEPHONE ENCOUNTER
----- Message from Will Linton sent at 12/28/2020  1:09 PM CST -----  Regarding: est care  Contact: [phone]  Pt is wanting to est. Care with this physician. Pt can be reached @ [phone]

## 2021-01-04 ENCOUNTER — OFFICE VISIT (OUTPATIENT)
Dept: INTERNAL MEDICINE | Facility: CLINIC | Age: 61
End: 2021-01-04
Attending: INTERNAL MEDICINE
Payer: COMMERCIAL

## 2021-01-04 VITALS
WEIGHT: 230.38 LBS | OXYGEN SATURATION: 98 % | DIASTOLIC BLOOD PRESSURE: 72 MMHG | HEIGHT: 64 IN | SYSTOLIC BLOOD PRESSURE: 148 MMHG | BODY MASS INDEX: 39.33 KG/M2 | HEART RATE: 78 BPM

## 2021-01-04 DIAGNOSIS — Z98.84 BARIATRIC SURGERY STATUS: ICD-10-CM

## 2021-01-04 DIAGNOSIS — Z12.31 BREAST CANCER SCREENING BY MAMMOGRAM: ICD-10-CM

## 2021-01-04 DIAGNOSIS — E03.9 PRIMARY HYPOTHYROIDISM: ICD-10-CM

## 2021-01-04 DIAGNOSIS — Z98.84 HISTORY OF GASTRIC BYPASS: ICD-10-CM

## 2021-01-04 DIAGNOSIS — I10 BENIGN ESSENTIAL HYPERTENSION: ICD-10-CM

## 2021-01-04 DIAGNOSIS — Z00.00 ANNUAL PHYSICAL EXAM: Primary | ICD-10-CM

## 2021-01-04 PROCEDURE — 99999 PR PBB SHADOW E&M-EST. PATIENT-LVL III: ICD-10-PCS | Mod: PBBFAC,,, | Performed by: INTERNAL MEDICINE

## 2021-01-04 PROCEDURE — 99214 OFFICE O/P EST MOD 30 MIN: CPT | Mod: S$GLB,,, | Performed by: INTERNAL MEDICINE

## 2021-01-04 PROCEDURE — 99999 PR PBB SHADOW E&M-EST. PATIENT-LVL III: CPT | Mod: PBBFAC,,, | Performed by: INTERNAL MEDICINE

## 2021-01-04 PROCEDURE — 99214 PR OFFICE/OUTPT VISIT, EST, LEVL IV, 30-39 MIN: ICD-10-PCS | Mod: S$GLB,,, | Performed by: INTERNAL MEDICINE

## 2021-01-04 RX ORDER — RISANKIZUMAB-RZAA 75 MG/0.83
KIT SUBCUTANEOUS
COMMUNITY
Start: 2020-12-28 | End: 2023-03-06

## 2021-01-04 RX ORDER — HYDROCHLOROTHIAZIDE 12.5 MG/1
12.5 TABLET ORAL DAILY
Qty: 90 TABLET | Refills: 2 | Status: SHIPPED | OUTPATIENT
Start: 2021-01-04 | End: 2021-11-24

## 2021-01-29 ENCOUNTER — HOSPITAL ENCOUNTER (OUTPATIENT)
Dept: RADIOLOGY | Facility: OTHER | Age: 61
Discharge: HOME | End: 2021-01-29
Attending: INTERNAL MEDICINE
Payer: COMMERCIAL

## 2021-01-29 DIAGNOSIS — Z12.31 BREAST CANCER SCREENING BY MAMMOGRAM: ICD-10-CM

## 2021-01-29 PROCEDURE — 77063 BREAST TOMOSYNTHESIS BI: CPT | Mod: 26,,, | Performed by: RADIOLOGY

## 2021-01-29 PROCEDURE — 77063 MAMMO DIGITAL SCREENING BILAT WITH TOMO: ICD-10-PCS | Mod: 26,,, | Performed by: RADIOLOGY

## 2021-01-29 PROCEDURE — 77067 MAMMO DIGITAL SCREENING BILAT WITH TOMO: ICD-10-PCS | Mod: 26,,, | Performed by: RADIOLOGY

## 2021-01-29 PROCEDURE — 77067 SCR MAMMO BI INCL CAD: CPT | Mod: TC

## 2021-01-29 PROCEDURE — 77067 SCR MAMMO BI INCL CAD: CPT | Mod: 26,,, | Performed by: RADIOLOGY

## 2021-02-17 ENCOUNTER — LAB VISIT (OUTPATIENT)
Dept: LAB | Facility: OTHER | Age: 61
End: 2021-02-17
Attending: INTERNAL MEDICINE
Payer: COMMERCIAL

## 2021-02-17 DIAGNOSIS — Z00.00 ANNUAL PHYSICAL EXAM: ICD-10-CM

## 2021-02-17 DIAGNOSIS — Z98.84 HISTORY OF GASTRIC BYPASS: ICD-10-CM

## 2021-02-17 LAB
ALBUMIN SERPL BCP-MCNC: 4.4 G/DL (ref 3.5–5.2)
ALP SERPL-CCNC: 81 U/L (ref 55–135)
ALT SERPL W/O P-5'-P-CCNC: 20 U/L (ref 10–44)
ANION GAP SERPL CALC-SCNC: 13 MMOL/L (ref 8–16)
AST SERPL-CCNC: 24 U/L (ref 10–40)
BASOPHILS # BLD AUTO: 0.03 K/UL (ref 0–0.2)
BASOPHILS NFR BLD: 0.6 % (ref 0–1.9)
BILIRUB SERPL-MCNC: 0.5 MG/DL (ref 0.1–1)
BUN SERPL-MCNC: 17 MG/DL (ref 8–23)
CALCIUM SERPL-MCNC: 9.7 MG/DL (ref 8.7–10.5)
CHLORIDE SERPL-SCNC: 103 MMOL/L (ref 95–110)
CHOLEST SERPL-MCNC: 187 MG/DL (ref 120–199)
CHOLEST/HDLC SERPL: 2.6 {RATIO} (ref 2–5)
CO2 SERPL-SCNC: 26 MMOL/L (ref 23–29)
CREAT SERPL-MCNC: 0.7 MG/DL (ref 0.5–1.4)
DIFFERENTIAL METHOD: NORMAL
EOSINOPHIL # BLD AUTO: 0.1 K/UL (ref 0–0.5)
EOSINOPHIL NFR BLD: 2.2 % (ref 0–8)
ERYTHROCYTE [DISTWIDTH] IN BLOOD BY AUTOMATED COUNT: 13.3 % (ref 11.5–14.5)
EST. GFR  (AFRICAN AMERICAN): >60 ML/MIN/1.73 M^2
EST. GFR  (NON AFRICAN AMERICAN): >60 ML/MIN/1.73 M^2
ESTIMATED AVG GLUCOSE: 97 MG/DL (ref 68–131)
FERRITIN SERPL-MCNC: 210 NG/ML (ref 20–300)
GLUCOSE SERPL-MCNC: 94 MG/DL (ref 70–110)
HBA1C MFR BLD: 5 % (ref 4–5.6)
HCT VFR BLD AUTO: 41.2 % (ref 37–48.5)
HDLC SERPL-MCNC: 72 MG/DL (ref 40–75)
HDLC SERPL: 38.5 % (ref 20–50)
HGB BLD-MCNC: 13.8 G/DL (ref 12–16)
IMM GRANULOCYTES # BLD AUTO: 0.02 K/UL (ref 0–0.04)
IMM GRANULOCYTES NFR BLD AUTO: 0.4 % (ref 0–0.5)
IRON SERPL-MCNC: 87 UG/DL (ref 30–160)
IRON SERPL-MCNC: 87 UG/DL (ref 30–160)
LDLC SERPL CALC-MCNC: 101.6 MG/DL (ref 63–159)
LYMPHOCYTES # BLD AUTO: 1.5 K/UL (ref 1–4.8)
LYMPHOCYTES NFR BLD: 30 % (ref 18–48)
MCH RBC QN AUTO: 30.6 PG (ref 27–31)
MCHC RBC AUTO-ENTMCNC: 33.5 G/DL (ref 32–36)
MCV RBC AUTO: 91 FL (ref 82–98)
MONOCYTES # BLD AUTO: 0.4 K/UL (ref 0.3–1)
MONOCYTES NFR BLD: 8.7 % (ref 4–15)
NEUTROPHILS # BLD AUTO: 2.9 K/UL (ref 1.8–7.7)
NEUTROPHILS NFR BLD: 58.1 % (ref 38–73)
NONHDLC SERPL-MCNC: 115 MG/DL
NRBC BLD-RTO: 0 /100 WBC
PLATELET # BLD AUTO: 212 K/UL (ref 150–350)
PMV BLD AUTO: 10.3 FL (ref 9.2–12.9)
POTASSIUM SERPL-SCNC: 3.8 MMOL/L (ref 3.5–5.1)
PROT SERPL-MCNC: 7.8 G/DL (ref 6–8.4)
PTH-INTACT SERPL-MCNC: 57.5 PG/ML (ref 9–77)
RBC # BLD AUTO: 4.51 M/UL (ref 4–5.4)
SATURATED IRON: 30 % (ref 20–50)
SODIUM SERPL-SCNC: 142 MMOL/L (ref 136–145)
TOTAL IRON BINDING CAPACITY: 292 UG/DL (ref 250–450)
TRANSFERRIN SERPL-MCNC: 197 MG/DL (ref 200–375)
TRIGL SERPL-MCNC: 67 MG/DL (ref 30–150)
TSH SERPL DL<=0.005 MIU/L-ACNC: 2.18 UIU/ML (ref 0.4–4)
VIT B12 SERPL-MCNC: 549 PG/ML (ref 210–950)
WBC # BLD AUTO: 4.93 K/UL (ref 3.9–12.7)

## 2021-02-17 PROCEDURE — 85025 COMPLETE CBC W/AUTO DIFF WBC: CPT

## 2021-02-17 PROCEDURE — 84443 ASSAY THYROID STIM HORMONE: CPT

## 2021-02-17 PROCEDURE — 80061 LIPID PANEL: CPT

## 2021-02-17 PROCEDURE — 82728 ASSAY OF FERRITIN: CPT

## 2021-02-17 PROCEDURE — 83970 ASSAY OF PARATHORMONE: CPT

## 2021-02-17 PROCEDURE — 36415 COLL VENOUS BLD VENIPUNCTURE: CPT

## 2021-02-17 PROCEDURE — 82652 VIT D 1 25-DIHYDROXY: CPT

## 2021-02-17 PROCEDURE — 84425 ASSAY OF VITAMIN B-1: CPT

## 2021-02-17 PROCEDURE — 83036 HEMOGLOBIN GLYCOSYLATED A1C: CPT

## 2021-02-17 PROCEDURE — 82607 VITAMIN B-12: CPT

## 2021-02-17 PROCEDURE — 80053 COMPREHEN METABOLIC PANEL: CPT

## 2021-02-17 PROCEDURE — 83540 ASSAY OF IRON: CPT

## 2021-02-22 LAB — 1,25(OH)2D3 SERPL-MCNC: 63 PG/ML (ref 20–79)

## 2021-02-22 NOTE — TELEPHONE ENCOUNTER
States she's having a physical in March and will pass.  Sent letter.    Trial period of prednisone resulted in marked improvement in BLE weakness. Will reduce dose to 20mg a day for the next week then 10mg a day. In the meantime she will schedule an appointment with PCP to establish long term treatment plan if he agrees.  On the other hand there may be some interaction with warfarin and she was advised not to take any warfarin tonight and check INR tomorrow.

## 2021-02-23 LAB — VIT B1 BLD-MCNC: 46 UG/L (ref 38–122)

## 2021-04-03 ENCOUNTER — OFFICE VISIT (OUTPATIENT)
Dept: URGENT CARE | Facility: CLINIC | Age: 61
End: 2021-04-03
Payer: COMMERCIAL

## 2021-04-03 VITALS
SYSTOLIC BLOOD PRESSURE: 121 MMHG | HEART RATE: 80 BPM | DIASTOLIC BLOOD PRESSURE: 75 MMHG | RESPIRATION RATE: 18 BRPM | OXYGEN SATURATION: 97 % | TEMPERATURE: 99 F

## 2021-04-03 DIAGNOSIS — S92.414A CLOSED NONDISPLACED FRACTURE OF PROXIMAL PHALANX OF RIGHT GREAT TOE, INITIAL ENCOUNTER: Primary | ICD-10-CM

## 2021-04-03 DIAGNOSIS — T14.90XA TRAUMA: ICD-10-CM

## 2021-04-03 PROCEDURE — 99214 PR OFFICE/OUTPT VISIT, EST, LEVL IV, 30-39 MIN: ICD-10-PCS | Mod: S$GLB,,, | Performed by: FAMILY MEDICINE

## 2021-04-03 PROCEDURE — 73630 XR FOOT COMPLETE 3 VIEW RIGHT: ICD-10-PCS | Mod: FY,RT,S$GLB, | Performed by: RADIOLOGY

## 2021-04-03 PROCEDURE — 99214 OFFICE O/P EST MOD 30 MIN: CPT | Mod: S$GLB,,, | Performed by: FAMILY MEDICINE

## 2021-04-03 PROCEDURE — 73630 X-RAY EXAM OF FOOT: CPT | Mod: FY,RT,S$GLB, | Performed by: RADIOLOGY

## 2022-02-23 ENCOUNTER — PATIENT OUTREACH (OUTPATIENT)
Dept: ADMINISTRATIVE | Facility: HOSPITAL | Age: 62
End: 2022-02-23
Payer: COMMERCIAL

## 2022-02-23 DIAGNOSIS — Z12.31 OTHER SCREENING MAMMOGRAM: ICD-10-CM

## 2022-02-24 ENCOUNTER — PATIENT OUTREACH (OUTPATIENT)
Dept: ADMINISTRATIVE | Facility: HOSPITAL | Age: 62
End: 2022-02-24
Payer: COMMERCIAL

## 2022-02-28 DIAGNOSIS — D84.9 IMMUNOSUPPRESSED STATUS: ICD-10-CM

## 2022-05-20 ENCOUNTER — TELEPHONE (OUTPATIENT)
Dept: INTERNAL MEDICINE | Facility: CLINIC | Age: 62
End: 2022-05-20
Payer: COMMERCIAL

## 2022-05-20 NOTE — TELEPHONE ENCOUNTER
Patient states that she tested positive on 5/20/2022 from a at home covid test.   Patient states that current symptoms are sore throat and cough.   Patient will like to know if she qualifies for plaxlovid medication. Patient was also sent information below via portal message per phone discussion. Thanks. Routed to Dr. Kerr for further instructions.     Below are suggestions for symptomatic relief:              -Tylenol every 4 hours OR ibuprofen every 6 hours as needed for pain/fever.              -Salt water gargles to soothe throat pain.              -Chloroseptic spray also helps to numb throat pain.              -Nasal saline spray reduces inflammation and dryness.              -Warm face compresses to help with facial sinus pain/pressure.              -Vicks vapor rub at night.              -Delsym helps with coughing at night              -Zyrtec/Claritin during the day & Benadryl at night may help with allergies.  If you DO NOT have Hypertension or any history of palpitations, it is ok to take over the counter Sudafed or Mucinex D or Allegra-D or Claritin-D or Zyrtec-D.  If you do take one of the above, it is ok to combine that with plain over the counter Mucinex or Allegra or Claritin or Zyrtec. If, for example, you are taking Zyrtec -D, you can combine that with Mucinex, but not Mucinex-D.  If you are taking Mucinex-D, you can combine that with plain Allegra or Claritin or Zyrtec.  If you DO have Hypertension or palpitations, it is safe to take Coricidin HBP for relief of sinus symptoms.       Due to CDC guideline once you receive an POSITIVE COVID-19 result there is no need to be re tested until 80 days after POSITIVE COVID TEST due to the result have a great chance of coming back POSITIVE but you are no longer infected. We do recommend that you are able to return to work if your symptoms have improve with in 10 days after POSITIVE COVID TEST and 24 hours with out having a fever (100.4 F) and is still  improving. If you would still like to be re tested you will need to seek testing to one of community sites  ===View-only below this line===

## 2022-05-20 NOTE — TELEPHONE ENCOUNTER
Paxlovid is recommended for those with COVID complication risk score of 3 or greater.  Patient has risk score of 2.  It is best to treat the symptoms, rest, and hydrate, hydrate, hydrate.  For sinus congestion/facial pressure/runny nose-nasal steroid such as generic Flonase, distilled salt water sinus rinses via neti pots or products such as Mohit Med Sinus Rinse or Sinugator. Must wash container or device and use bottled water.   Sudafed PE Cold and cough. Do not take this if you have a history of high blood pressure or history of stroke or heart attack. This multi symptom medicine has the active ingredient that is Mucinex-DM. If you have high blood pressure any Coricidin HBP product will be safe for you.   For cough-mucinex DM or any generic version, humidifier or hot shower, and address your sinuses or runny nose.  None of these symptoms will completely knock out your symptoms but will make them manageable.  If symptoms do not begin to improve after day 7-10 or you experience fevers >100.4 greater than 4 days in a row you should be evaluated in clinic.

## 2022-05-20 NOTE — TELEPHONE ENCOUNTER
----- Message from Amber Remi sent at 5/20/2022 12:06 PM CDT -----  Regarding: concerns  Name of Who is Calling: Kim            What is the request in detail: Patient is requesting a call back because she tested positive for covid on a home tests. [place] want to know if a Rx can be called in.           Can the clinic reply by [facility]: No           What Number to Call Back if not in [facility]: [phone]

## 2022-05-30 ENCOUNTER — PATIENT MESSAGE (OUTPATIENT)
Dept: ADMINISTRATIVE | Facility: HOSPITAL | Age: 62
End: 2022-05-30
Payer: COMMERCIAL

## 2022-06-21 DIAGNOSIS — E03.9 PRIMARY HYPOTHYROIDISM: ICD-10-CM

## 2022-06-21 DIAGNOSIS — I10 BENIGN ESSENTIAL HYPERTENSION: Primary | ICD-10-CM

## 2022-06-22 NOTE — TELEPHONE ENCOUNTER
No new care gaps identified.  [facility] Embedded Care Gaps. Reference number: [id number]. 6/21/2022   7:34:57 PM CDT

## 2022-06-24 RX ORDER — LEVOTHYROXINE SODIUM 112 UG/1
TABLET ORAL
Qty: 30 TABLET | Refills: 0 | Status: SHIPPED | OUTPATIENT
Start: 2022-06-24 | End: 2022-08-05 | Stop reason: SDUPTHER

## 2022-06-24 RX ORDER — HYDROCHLOROTHIAZIDE 12.5 MG/1
TABLET ORAL
Qty: 30 TABLET | Refills: 0 | Status: SHIPPED | OUTPATIENT
Start: 2022-06-24 | End: 2022-07-29

## 2022-07-28 DIAGNOSIS — I10 BENIGN ESSENTIAL HYPERTENSION: ICD-10-CM

## 2022-07-28 DIAGNOSIS — E03.9 PRIMARY HYPOTHYROIDISM: ICD-10-CM

## 2022-07-28 RX ORDER — LEVOTHYROXINE SODIUM 112 UG/1
TABLET ORAL
Qty: 30 TABLET | Refills: 0 | OUTPATIENT
Start: 2022-07-28

## 2022-07-28 NOTE — TELEPHONE ENCOUNTER
No new care gaps identified.  [facility] Embedded Care Gaps. Reference number: [id number]. 7/28/2022   9:36:28 AM FEDET

## 2022-07-28 NOTE — TELEPHONE ENCOUNTER
No new care gaps identified.  [facility] Embedded Care Gaps. Reference number: [id number]. 7/28/2022   11:33:22 AM FEDET

## 2022-07-29 RX ORDER — HYDROCHLOROTHIAZIDE 12.5 MG/1
TABLET ORAL
Qty: 30 TABLET | Refills: 0 | Status: SHIPPED | OUTPATIENT
Start: 2022-07-29 | End: 2022-08-05 | Stop reason: SDUPTHER

## 2022-07-29 NOTE — TELEPHONE ENCOUNTER
Refill Routing Note   Medication(s) are not appropriate for processing by [facility] for the following reason(s):      - Patient has not been seen in over 15 months by PCP  - Required laboratory values are outdated  - Required vitals are outdated    [facility] action(s):  Defer          Medication reconciliation completed: No     Appointments  past 12m or future 3m with PCP    Date Provider   Last Visit   1/4/2021 Harrison Kerr MD   Next Visit   8/24/2022 Harrison Kerr MD   ED visits in past 90 days: 0        Note composed:11:47 PM 07/28/2022

## 2022-08-05 DIAGNOSIS — I10 BENIGN ESSENTIAL HYPERTENSION: ICD-10-CM

## 2022-08-05 DIAGNOSIS — E03.9 PRIMARY HYPOTHYROIDISM: ICD-10-CM

## 2022-08-05 RX ORDER — LEVOTHYROXINE SODIUM 112 UG/1
112 TABLET ORAL
Qty: 30 TABLET | Refills: 0 | Status: SHIPPED | OUTPATIENT
Start: 2022-08-05 | End: 2022-08-27

## 2022-08-05 RX ORDER — HYDROCHLOROTHIAZIDE 12.5 MG/1
12.5 TABLET ORAL DAILY
Qty: 30 TABLET | Refills: 0 | Status: SHIPPED | OUTPATIENT
Start: 2022-08-05 | End: 2022-08-29

## 2022-08-05 NOTE — TELEPHONE ENCOUNTER
No new care gaps identified.  [facility] Embedded Care Gaps. Reference number: [id number]. 8/05/2022   10:24:35 AM CDT

## 2022-08-05 NOTE — TELEPHONE ENCOUNTER
----- Message from Lolis Mtz sent at 8/5/2022 10:11 AM CDT -----  Type: RX Refill Request    Who Called: self     Have you contacted your pharmacy: no     Refill or New Rx: refill     RX Name and Strength: levothyroxine (SYNTHROID) 112 MCG tablet, hydroCHLOROthiazide (HYDRODIURIL) 12.5 MG Tab    Preferred Pharmacy with phone number:   [facility][id number] - [place] [id number] [address]  Phone: [phone] Fax: [phone]    Local or Mail Order: local     Would the patient rather a call back or a response via [facility]? Call back     Best Call Back Number: [phone]

## 2022-08-05 NOTE — TELEPHONE ENCOUNTER
Requested medication has been pended and routed to Dr. Kerr for approval. LOV 1/4/2021 Upcoming Visits 8/24/22

## 2022-08-24 ENCOUNTER — OFFICE VISIT (OUTPATIENT)
Dept: INTERNAL MEDICINE | Facility: CLINIC | Age: 62
End: 2022-08-24
Attending: INTERNAL MEDICINE
Payer: COMMERCIAL

## 2022-08-24 ENCOUNTER — HOSPITAL ENCOUNTER (OUTPATIENT)
Dept: RADIOLOGY | Facility: OTHER | Age: 62
Discharge: HOME | End: 2022-08-24
Attending: INTERNAL MEDICINE
Payer: COMMERCIAL

## 2022-08-24 ENCOUNTER — PATIENT MESSAGE (OUTPATIENT)
Dept: ADMINISTRATIVE | Facility: HOSPITAL | Age: 62
End: 2022-08-24
Payer: COMMERCIAL

## 2022-08-24 VITALS
HEIGHT: 64 IN | SYSTOLIC BLOOD PRESSURE: 132 MMHG | HEART RATE: 75 BPM | BODY MASS INDEX: 37.11 KG/M2 | OXYGEN SATURATION: 97 % | DIASTOLIC BLOOD PRESSURE: 80 MMHG | WEIGHT: 217.38 LBS

## 2022-08-24 DIAGNOSIS — I10 BENIGN ESSENTIAL HYPERTENSION: ICD-10-CM

## 2022-08-24 DIAGNOSIS — L40.9 PSORIASIS: ICD-10-CM

## 2022-08-24 DIAGNOSIS — Z00.00 ANNUAL PHYSICAL EXAM: Primary | ICD-10-CM

## 2022-08-24 DIAGNOSIS — Z12.4 CERVICAL CANCER SCREENING: ICD-10-CM

## 2022-08-24 DIAGNOSIS — E03.9 PRIMARY HYPOTHYROIDISM: ICD-10-CM

## 2022-08-24 DIAGNOSIS — Z98.84 HISTORY OF GASTRIC BYPASS: ICD-10-CM

## 2022-08-24 DIAGNOSIS — Z12.31 OTHER SCREENING MAMMOGRAM: ICD-10-CM

## 2022-08-24 PROCEDURE — 99999 PR PBB SHADOW E&M-EST. PATIENT-LVL III: CPT | Mod: PBBFAC,,, | Performed by: INTERNAL MEDICINE

## 2022-08-24 PROCEDURE — 77067 SCR MAMMO BI INCL CAD: CPT | Mod: 26,,, | Performed by: RADIOLOGY

## 2022-08-24 PROCEDURE — 99999 PR PBB SHADOW E&M-EST. PATIENT-LVL III: ICD-10-PCS | Mod: PBBFAC,,, | Performed by: INTERNAL MEDICINE

## 2022-08-24 PROCEDURE — 99396 PR PREVENTIVE VISIT,EST,40-64: ICD-10-PCS | Mod: S$GLB,,, | Performed by: INTERNAL MEDICINE

## 2022-08-24 PROCEDURE — 77067 MAMMO DIGITAL SCREENING BILAT WITH TOMO: ICD-10-PCS | Mod: 26,,, | Performed by: RADIOLOGY

## 2022-08-24 PROCEDURE — 77063 BREAST TOMOSYNTHESIS BI: CPT | Mod: TC

## 2022-08-24 PROCEDURE — 77063 BREAST TOMOSYNTHESIS BI: CPT | Mod: 26,,, | Performed by: RADIOLOGY

## 2022-08-24 PROCEDURE — 99396 PREV VISIT EST AGE 40-64: CPT | Mod: S$GLB,,, | Performed by: INTERNAL MEDICINE

## 2022-08-24 PROCEDURE — 77063 MAMMO DIGITAL SCREENING BILAT WITH TOMO: ICD-10-PCS | Mod: 26,,, | Performed by: RADIOLOGY

## 2022-08-24 RX ORDER — CLOBETASOL PROPIONATE 0.46 MG/ML
SOLUTION TOPICAL NIGHTLY
COMMUNITY
Start: 2022-04-16

## 2022-08-24 NOTE — PROGRESS NOTES
"Subjective:       Patient ID: Kim Garcia is a 62 y.o. female.    Chief Complaint: Annual Exam    Here for annual exam    06/2022 Right lateral mid thigh pain that is most painful going up stairs. This is improving.       ### HTN ###  HCTZ 12.5mg  Denies frequent or current HA, intermittent blurry vision, dizziness, CP, or SOB.    ### hypothyroidism ###  Levothyroxine 112mcg  Pt denies fatigue, unexplained symone gain/loss, palpitations, tremors, diarrhea, constipation, changes to hair/skin, heat/cold intolerance, changes in voice or dysphagia.     ### plaque psoriasis ###  Tx with Ab Skyrizi     ### Hx gastric sleeve ###  MVI. Not taking vitamin D as consistently.  Performing keto lately. Has gone one month without etoh          Review of Systems   Constitutional: Negative for chills, fatigue, fever and unexpected weight change.   HENT: Negative for ear pain, hearing loss, postnasal drip, tinnitus, trouble swallowing and voice change.    Respiratory: Negative for cough, chest tightness, shortness of breath and wheezing.    Cardiovascular: Negative for chest pain, palpitations and leg swelling.   Gastrointestinal: Negative for abdominal pain, blood in stool, diarrhea, nausea and vomiting.   Endocrine: Negative for polydipsia, polyphagia and polyuria.   Genitourinary: Negative for difficulty urinating, dysuria, hematuria and vaginal bleeding.   Skin: Negative for rash.   Allergic/Immunologic: Negative for food allergies.   Neurological: Negative for dizziness, numbness and headaches.   Hematological: Does not bruise/bleed easily.   Psychiatric/Behavioral: The patient is not nervous/anxious.        Objective:      Vitals:    08/24/22 0822   BP: 132/80   Pulse: 75   SpO2: 97%   Weight: 98.6 kg (217 lb 6 oz)   Height: 5' 4" (1.626 m)      Physical Exam  Constitutional:       General: She is not in acute distress.     Appearance: She is well-developed.   HENT:      Head: Normocephalic and atraumatic.      " Mouth/Throat:      Pharynx: No oropharyngeal exudate.   Eyes:      General: No scleral icterus.     Conjunctiva/sclera: Conjunctivae normal.      Pupils: Pupils are equal, round, and reactive to light.   Neck:      Thyroid: No thyromegaly.   Cardiovascular:      Rate and Rhythm: Normal rate and regular rhythm.      Heart sounds: Normal heart sounds. No murmur heard.  Pulmonary:      Effort: Pulmonary effort is normal.      Breath sounds: Normal breath sounds. No wheezing or rales.   Abdominal:      General: There is no distension.      Palpations: Abdomen is soft.      Tenderness: There is no abdominal tenderness.   Musculoskeletal:         General: No tenderness.   Lymphadenopathy:      Cervical: No cervical adenopathy.   Skin:     General: Skin is warm and dry.   Neurological:      Mental Status: She is alert and oriented to person, place, and time.   Psychiatric:         Behavior: Behavior normal.         Assessment:       1. Annual physical exam    2. Cervical cancer screening    3. Primary hypothyroidism, 1999    4. BMI 33.0-33.9,adult    5. Benign essential hypertension    6. Psoriasis    7. History of gastric bypass        Plan:       Kim was seen today for annual exam.    Diagnoses and all orders for this visit:    Annual physical exam  -     CBC Auto Differential; Future  -     Comprehensive Metabolic Panel; Future  -     Hemoglobin A1C; Future  -     Lipid Panel; Future  -     TSH; Future    Cervical cancer screening  -     Ambulatory referral/consult to Gynecology; Future    Primary hypothyroidism, 1999   Clinically euthyroid. Update TSH.    BMI 33.0-33.9,adult    Benign essential hypertension   Controlled and asymptomatic.  Continue current Rx regimen.    Psoriasis  -     Tissue transglutaminase, IgA; Future  -     IgA; Future    History of gastric bypass  -     Vitamin B12; Future  -     Ferritin; Future  -     Iron; Future  -     Iron and TIBC; Future  -     Vitamin B1; Future  -     Folate;  Future  -     Vitamin D; Future           Harrison Heart MD  Internal Medicine-[facility]        Side effects of medication(s) were discussed in detail and patient voiced understanding.  Patient will call back for any issues or complications.

## 2022-10-10 ENCOUNTER — PATIENT MESSAGE (OUTPATIENT)
Dept: ADMINISTRATIVE | Facility: HOSPITAL | Age: 62
End: 2022-10-10
Payer: COMMERCIAL

## 2022-10-21 ENCOUNTER — TELEPHONE (OUTPATIENT)
Dept: INTERNAL MEDICINE | Facility: CLINIC | Age: 62
End: 2022-10-21
Payer: COMMERCIAL

## 2022-10-21 NOTE — TELEPHONE ENCOUNTER
Called pt to verify if she went to OBGYN Dr. Goff yet because we held her signed KARI for medical records until she went to her visit. Pt did not schedule yet and is out of town. Pt will schedule when she returns home. Once scheduled, I'll send KARI to this provider.

## 2023-01-18 ENCOUNTER — PATIENT MESSAGE (OUTPATIENT)
Dept: ADMINISTRATIVE | Facility: HOSPITAL | Age: 63
End: 2023-01-18
Payer: COMMERCIAL

## 2023-02-02 ENCOUNTER — TELEPHONE (OUTPATIENT)
Dept: INTERNAL MEDICINE | Facility: CLINIC | Age: 63
End: 2023-02-02
Payer: COMMERCIAL

## 2023-02-02 NOTE — TELEPHONE ENCOUNTER
Faxed KARI TO CISCO REMY TO  fax# [phone]/office# [phone] FOR RECORDS. I have attached a confirmation sheet to this fax located in my file cabinet.

## 2023-02-03 ENCOUNTER — TELEPHONE (OUTPATIENT)
Dept: INTERNAL MEDICINE | Facility: CLINIC | Age: 63
End: 2023-02-03
Payer: COMMERCIAL

## 2023-02-03 NOTE — TELEPHONE ENCOUNTER
APPT FOR 2/24/2023 RESCHEDULED W/ PT AS DR. OSCAR WILL BE OUT OF THE OFFICE FROM 2/20/2023-2/24/2023

## 2023-03-06 ENCOUNTER — OFFICE VISIT (OUTPATIENT)
Dept: URGENT CARE | Facility: CLINIC | Age: 63
End: 2023-03-06
Payer: COMMERCIAL

## 2023-03-06 VITALS
BODY MASS INDEX: 37.05 KG/M2 | OXYGEN SATURATION: 98 % | TEMPERATURE: 97 F | DIASTOLIC BLOOD PRESSURE: 82 MMHG | HEIGHT: 64 IN | SYSTOLIC BLOOD PRESSURE: 138 MMHG | HEART RATE: 68 BPM | WEIGHT: 217 LBS | RESPIRATION RATE: 18 BRPM

## 2023-03-06 DIAGNOSIS — L03.011 CELLULITIS OF FINGER OF RIGHT HAND: Primary | ICD-10-CM

## 2023-03-06 PROCEDURE — 99213 OFFICE O/P EST LOW 20 MIN: CPT | Mod: S$GLB,,, | Performed by: FAMILY MEDICINE

## 2023-03-06 PROCEDURE — 99213 PR OFFICE/OUTPT VISIT, EST, LEVL III, 20-29 MIN: ICD-10-PCS | Mod: S$GLB,,, | Performed by: FAMILY MEDICINE

## 2023-03-06 RX ORDER — CEPHALEXIN 500 MG/1
500 CAPSULE ORAL 4 TIMES DAILY
Qty: 40 CAPSULE | Refills: 0 | Status: SHIPPED | OUTPATIENT
Start: 2023-03-06 | End: 2023-03-16

## 2023-03-06 NOTE — PROGRESS NOTES
"Subjective:       Patient ID: Kim Garcia is a 62 y.o. female.    Vitals:  height is 5' 4" (1.626 m) and weight is 98.4 kg (217 lb). Her oral temperature is 97.1 °F (36.2 °C). Her blood pressure is 138/82 and her pulse is 68. Her respiration is 18 and oxygen saturation is 98%.     Chief Complaint: Hand Pain    Pt states she quilt and poked her right thumb last night and has had pain and swelling to her thumb since. Pt states the swelling has worsened so she came to have it evaluated to assure she has no infection being that her nails were unclean. Pt also states its warm to touch and pt is not aware if she is uptd on tdap shot     Hand Pain   The incident occurred 12 to 24 hours ago. The incident occurred at home. The injury mechanism was a direct blow. The pain is present in the right hand. The quality of the pain is described as aching. The pain is at a severity of 6/10. The pain is moderate. The pain has been Fluctuating since the incident. Associated symptoms comments: throbbing. The symptoms are aggravated by palpation and movement. Treatments tried: soaked in warm water. The treatment provided mild relief.   ROS    Objective:      Physical Exam   Constitutional: She is oriented to person, place, and time.   Abdominal: Normal appearance.   Musculoskeletal:         General: Swelling and tenderness present.      Comments: Swelling and tenderness of distal thumb below distal nail bed. She denies acute pain in fat pad(doubt felon).    Neurological: no focal deficit. She is alert and oriented to person, place, and time.   Skin: Skin is warm and dry.   Psychiatric: Her behavior is normal. Judgment and thought content normal.   Nursing note and vitals reviewed.      Assessment:       1. Cellulitis of finger of right hand          Plan:         Cellulitis of finger of right hand  -     cephALEXin (KEFLEX) 500 MG capsule; Take 1 capsule (500 mg total) by mouth 4 (four) times daily. for 10 days  Dispense: 40 " capsule; Refill: 0    Pt or guardian provided educational materials and instructions regarding their visit diagnosis.

## 2023-03-15 ENCOUNTER — TELEPHONE (OUTPATIENT)
Dept: URGENT CARE | Facility: CLINIC | Age: 63
End: 2023-03-15
Payer: COMMERCIAL

## 2023-03-15 NOTE — TELEPHONE ENCOUNTER
Pt called and mentions she is now on day 7 of taking keflex and has developed some hives on her back. I advised her to stop the antibiotic and since her thumb is all better , I do not believe she needs more antibiotic. I added keflex to her medical history as an allergy. Advised her to call or return if finger gets worse. All questions answered

## 2023-03-22 ENCOUNTER — OFFICE VISIT (OUTPATIENT)
Dept: INTERNAL MEDICINE | Facility: CLINIC | Age: 63
End: 2023-03-22
Attending: INTERNAL MEDICINE
Payer: COMMERCIAL

## 2023-03-22 VITALS
WEIGHT: 222.44 LBS | BODY MASS INDEX: 37.98 KG/M2 | SYSTOLIC BLOOD PRESSURE: 132 MMHG | HEIGHT: 64 IN | HEART RATE: 84 BPM | DIASTOLIC BLOOD PRESSURE: 74 MMHG | OXYGEN SATURATION: 98 %

## 2023-03-22 DIAGNOSIS — M85.80 OSTEOPENIA, UNSPECIFIED LOCATION: ICD-10-CM

## 2023-03-22 DIAGNOSIS — I10 BENIGN ESSENTIAL HYPERTENSION: ICD-10-CM

## 2023-03-22 DIAGNOSIS — Z98.84 BARIATRIC SURGERY STATUS: ICD-10-CM

## 2023-03-22 DIAGNOSIS — E03.9 PRIMARY HYPOTHYROIDISM: ICD-10-CM

## 2023-03-22 DIAGNOSIS — Z12.4 CERVICAL CANCER SCREENING: Primary | ICD-10-CM

## 2023-03-22 PROCEDURE — 99213 PR OFFICE/OUTPT VISIT, EST, LEVL III, 20-29 MIN: ICD-10-PCS | Mod: S$GLB,,, | Performed by: INTERNAL MEDICINE

## 2023-03-22 PROCEDURE — 99999 PR PBB SHADOW E&M-EST. PATIENT-LVL III: CPT | Mod: PBBFAC,,, | Performed by: INTERNAL MEDICINE

## 2023-03-22 PROCEDURE — 99999 PR PBB SHADOW E&M-EST. PATIENT-LVL III: ICD-10-PCS | Mod: PBBFAC,,, | Performed by: INTERNAL MEDICINE

## 2023-03-22 PROCEDURE — 99213 OFFICE O/P EST LOW 20 MIN: CPT | Mod: S$GLB,,, | Performed by: INTERNAL MEDICINE

## 2023-04-18 ENCOUNTER — PATIENT MESSAGE (OUTPATIENT)
Dept: ADMINISTRATIVE | Facility: HOSPITAL | Age: 63
End: 2023-04-18
Payer: COMMERCIAL

## 2023-05-06 NOTE — PROGRESS NOTES
"Subjective:       Patient ID: Kim Garcia is a 62 y.o. female.    Chief Complaint: Annual Exam    Here for annual exam    Patient presents today for routine evaluation, physical, and labs. Patient has no major concerns or complaints today.     ### HTN ###  HCTZ 12.5mg  Denies frequent or current HA, intermittent blurry vision, dizziness, CP, or SOB.     ### hypothyroidism ###  Levothyroxine 112mcg  Pt denies fatigue, unexplained symone gain/loss, palpitations, tremors, diarrhea, constipation, changes to hair/skin, heat/cold intolerance, changes in voice or dysphagia.      ### plaque psoriasis ###  Prior Tx :Skyrizi      ### Hx gastric sleeve ###  MVI. Not taking vitamin D as consistently.  Minimizing carbohydrates and Etoh.          Review of Systems   Constitutional:  Negative for chills, fatigue, fever and unexpected weight change.   HENT:  Negative for ear pain, hearing loss, postnasal drip, tinnitus, trouble swallowing and voice change.    Respiratory:  Negative for cough, chest tightness, shortness of breath and wheezing.    Cardiovascular:  Negative for chest pain, palpitations and leg swelling.   Gastrointestinal:  Negative for abdominal pain, blood in stool, diarrhea, nausea and vomiting.   Endocrine: Negative for polydipsia, polyphagia and polyuria.   Genitourinary:  Negative for difficulty urinating, dysuria, hematuria and vaginal bleeding.   Skin:  Negative for rash.   Allergic/Immunologic: Negative for food allergies.   Neurological:  Negative for dizziness, numbness and headaches.   Hematological:  Does not bruise/bleed easily.   Psychiatric/Behavioral:  The patient is not nervous/anxious.      Objective:      Vitals:    03/22/23 1525   BP: 132/74   Pulse: 84   SpO2: 98%   Weight: 100.9 kg (222 lb 7.1 oz)   Height: 5' 4" (1.626 m)      Physical Exam  Vitals and nursing note reviewed.   Constitutional:       General: She is not in acute distress.     Appearance: Normal appearance. She is " well-developed.   HENT:      Head: Normocephalic and atraumatic.      Mouth/Throat:      Pharynx: No oropharyngeal exudate.   Eyes:      General: No scleral icterus.     Conjunctiva/sclera: Conjunctivae normal.      Pupils: Pupils are equal, round, and reactive to light.   Neck:      Thyroid: No thyromegaly.   Cardiovascular:      Rate and Rhythm: Normal rate and regular rhythm.      Heart sounds: Normal heart sounds. No murmur heard.  Pulmonary:      Effort: Pulmonary effort is normal.      Breath sounds: Normal breath sounds. No wheezing or rales.   Abdominal:      General: There is no distension.   Musculoskeletal:         General: No tenderness.   Lymphadenopathy:      Cervical: No cervical adenopathy.   Skin:     General: Skin is warm and dry.   Neurological:      Mental Status: She is alert and oriented to person, place, and time.   Psychiatric:         Behavior: Behavior normal.       Assessment:       1. Cervical cancer screening    2. Primary hypothyroidism, 1999    3. Osteopenia, unspecified location    4. Bariatric surgery status, sleeve 2010    5. Benign essential hypertension        Plan:       Kim was seen today for annual exam.    Diagnoses and all orders for this visit:    Cervical cancer screening  F/u gyn  Primary hypothyroidism, 1999   Labs at next visit in 6 months for surveillance  Osteopenia, unspecified location  Labs at next visit in 6 months for surveillance  Bariatric surgery status, sleeve 2010   Labs at next visit in 6 months for surveillance  Benign essential hypertension   .nmjdcurr           Harrison Heart MD  Internal Medicine-[facility]        Side effects of medication(s) were discussed in detail and patient voiced understanding.  Patient will call back for any issues or complications.

## 2023-06-29 ENCOUNTER — PATIENT MESSAGE (OUTPATIENT)
Dept: INTERNAL MEDICINE | Facility: CLINIC | Age: 63
End: 2023-06-29
Payer: COMMERCIAL

## 2023-06-30 NOTE — TELEPHONE ENCOUNTER
Care Due:                  Date            Visit Type   Department     Provider  --------------------------------------------------------------------------------                                MYCHART                              ANNUAL                              CHECKUP/PHY  [facility] INTERNAL  Last Visit: 03-      [facility]       Harrison Kerr  Next Visit: None Scheduled  None         None Found                                                            Last  Test          Frequency    Reason                     Performed    Due Date  --------------------------------------------------------------------------------    CMP.........  12 months..  hydroCHLOROthiazide......  08- 08-    [facility] Embedded Care Due Messages. Reference number: [id number].   6/30/2023 3:22:36 PM CDT

## 2023-07-11 ENCOUNTER — OFFICE VISIT (OUTPATIENT)
Dept: INTERNAL MEDICINE | Facility: CLINIC | Age: 63
End: 2023-07-11
Attending: INTERNAL MEDICINE
Payer: COMMERCIAL

## 2023-07-11 ENCOUNTER — PATIENT MESSAGE (OUTPATIENT)
Dept: INTERNAL MEDICINE | Facility: CLINIC | Age: 63
End: 2023-07-11

## 2023-07-11 VITALS — OXYGEN SATURATION: 98 % | HEART RATE: 88 BPM

## 2023-07-11 DIAGNOSIS — I10 BENIGN ESSENTIAL HYPERTENSION: ICD-10-CM

## 2023-07-11 DIAGNOSIS — E88.810 METABOLIC SYNDROME: Primary | ICD-10-CM

## 2023-07-11 DIAGNOSIS — Z98.84 HISTORY OF GASTRIC BYPASS: ICD-10-CM

## 2023-07-11 PROCEDURE — 99213 PR OFFICE/OUTPT VISIT, EST, LEVL III, 20-29 MIN: ICD-10-PCS | Mod: 95,,, | Performed by: INTERNAL MEDICINE

## 2023-07-11 PROCEDURE — 99213 OFFICE O/P EST LOW 20 MIN: CPT | Mod: 95,,, | Performed by: INTERNAL MEDICINE

## 2023-07-11 RX ORDER — METRONIDAZOLE 7.5 MG/G
CREAM TOPICAL 2 TIMES DAILY PRN
COMMUNITY
Start: 2023-05-25 | End: 2023-10-18

## 2023-07-11 RX ORDER — SEMAGLUTIDE 0.5 MG/.5ML
0.5 INJECTION, SOLUTION SUBCUTANEOUS
Qty: 2 ML | Refills: 1 | Status: SHIPPED | OUTPATIENT
Start: 2023-07-11 | End: 2023-07-11

## 2023-07-11 RX ORDER — SEMAGLUTIDE 0.68 MG/ML
0.5 INJECTION, SOLUTION SUBCUTANEOUS
Qty: 3 ML | Refills: 1 | Status: SHIPPED | OUTPATIENT
Start: 2023-07-11 | End: 2023-07-13

## 2023-07-11 RX ORDER — HYDROCORTISONE 25 MG/G
CREAM TOPICAL 2 TIMES DAILY PRN
COMMUNITY
Start: 2023-05-25

## 2023-07-11 RX ORDER — KETOCONAZOLE 20 MG/G
CREAM TOPICAL 2 TIMES DAILY
COMMUNITY
Start: 2023-05-25 | End: 2023-10-18

## 2023-07-11 NOTE — PROGRESS NOTES
Subjective:       Patient ID: Kim Garcia is a 63 y.o. female.    Chief Complaint: Weight Loss    Here for     64 yo F with PMhx of obesity, HTN, hx of gastric sleeve.Pt would like to try medication assistance for weight loss in addition to diet and lifestyle changes. Bariatric medicine clinic discussed. GLP agonists and their risk of thyroid cancer, pancreatitis, and dyspepsia discussed. Pt does not have a family hx of MEN, thyroid CA, pancreatitis, nor a personal Hx of pancreatitis. Pt is aware of risk and amenable. Pt aware we may be unable to obtain medications due to insurance restrictions and lack of diagnosis of diabetes. We will try options but do not have medical indication or scenario for us to pursue a prior authorization without diagnosis of diabetes. Pt voiced understanding.         Review of Systems   Constitutional:  Negative for activity change and unexpected weight change.   HENT:  Negative for hearing loss, rhinorrhea and trouble swallowing.    Eyes:  Negative for discharge and visual disturbance.   Respiratory:  Negative for chest tightness and wheezing.    Cardiovascular:  Negative for chest pain and palpitations.   Gastrointestinal:  Negative for blood in stool, constipation, diarrhea and vomiting.   Endocrine: Negative for polydipsia and polyuria.   Genitourinary:  Negative for difficulty urinating, dysuria, hematuria and menstrual problem.   Musculoskeletal:  Negative for arthralgias, joint swelling and neck pain.   Neurological:  Negative for weakness and headaches.   Psychiatric/Behavioral:  Negative for confusion and dysphoric mood.      Objective:      Vitals:    07/11/23 0938   Pulse: 88   SpO2: 98%      Physical Exam  Constitutional:       General: She is not in acute distress.     Appearance: Normal appearance. She is well-developed. She is not diaphoretic.   HENT:      Head: Normocephalic and atraumatic.   Eyes:      General: No scleral icterus.        Right eye: No discharge.          Left eye: No discharge.      Conjunctiva/sclera: Conjunctivae normal.   Neck:      Thyroid: No thyromegaly.   Pulmonary:      Effort: Pulmonary effort is normal. No tachypnea, accessory muscle usage or respiratory distress.      Breath sounds: Normal breath sounds.   Abdominal:      General: There is no distension.   Skin:     General: Skin is warm and dry.      Coloration: Skin is not pale.   Neurological:      Mental Status: She is alert and oriented to person, place, and time.   Psychiatric:         Speech: Speech normal.         Behavior: Behavior normal.       Assessment:       1. BMI 38.0-38.9,adult    2. Benign essential hypertension    3. History of gastric bypass        Plan:       Kim was seen today for weight loss.    Diagnoses and all orders for this visit:    BMI 38.0-38.9,adult  -     semaglutide, weight loss, (WEGOVY) 0.5 mg/0.5 mL PnIj; Inject 0.5 mg into the skin every 7 days. If tolerating after one month will send in 1mg dosing    Benign essential hypertension  -     semaglutide, weight loss, (WEGOVY) 0.5 mg/0.5 mL PnIj; Inject 0.5 mg into the skin every 7 days. If tolerating after one month will send in 1mg dosing    History of gastric bypass  -     semaglutide, weight loss, (WEGOVY) 0.5 mg/0.5 mL PnIj; Inject 0.5 mg into the skin every 7 days. If tolerating after one month will send in 1mg dosing           Harrison Heart MD  Internal Medicine-[facility]        Side effects of medication(s) were discussed in detail and patient voiced understanding.  Patient will call back for any issues or complications.

## 2023-07-12 ENCOUNTER — PATIENT MESSAGE (OUTPATIENT)
Dept: INTERNAL MEDICINE | Facility: CLINIC | Age: 63
End: 2023-07-12
Payer: COMMERCIAL

## 2023-07-12 DIAGNOSIS — Z00.00 ANNUAL PHYSICAL EXAM: ICD-10-CM

## 2023-07-12 DIAGNOSIS — E88.810 METABOLIC SYNDROME: ICD-10-CM

## 2023-07-12 DIAGNOSIS — Z13.1 SCREENING FOR DIABETES MELLITUS: Primary | ICD-10-CM

## 2023-07-13 RX ORDER — DULAGLUTIDE 0.75 MG/.5ML
0.75 INJECTION, SOLUTION SUBCUTANEOUS
Qty: 4 PEN | Refills: 0 | Status: SHIPPED | OUTPATIENT
Start: 2023-07-13 | End: 2023-10-18

## 2023-07-14 ENCOUNTER — PATIENT MESSAGE (OUTPATIENT)
Dept: INTERNAL MEDICINE | Facility: CLINIC | Age: 63
End: 2023-07-14
Payer: COMMERCIAL

## 2023-07-18 NOTE — TELEPHONE ENCOUNTER
Spoke with Mrs. Garcia and scheduled est care appt with Dr. Kerr for her mother-in-law on 9/6/2023 at 9am

## 2023-07-20 ENCOUNTER — TELEPHONE (OUTPATIENT)
Dept: ADMINISTRATIVE | Facility: OTHER | Age: 63
End: 2023-07-20
Payer: COMMERCIAL

## 2023-07-20 NOTE — TELEPHONE ENCOUNTER
LM for scheduling of Bariatrics referral, and My chart will be sent to patient. Contact number provided for any questions.

## 2023-08-02 ENCOUNTER — PATIENT MESSAGE (OUTPATIENT)
Dept: BARIATRICS | Facility: CLINIC | Age: 63
End: 2023-08-02
Payer: COMMERCIAL

## 2023-08-02 ENCOUNTER — TELEPHONE (OUTPATIENT)
Dept: BARIATRICS | Facility: CLINIC | Age: 63
End: 2023-08-02
Payer: COMMERCIAL

## 2023-08-03 ENCOUNTER — TELEPHONE (OUTPATIENT)
Dept: BARIATRICS | Facility: CLINIC | Age: 63
End: 2023-08-03
Payer: COMMERCIAL

## 2023-08-15 DIAGNOSIS — E03.9 PRIMARY HYPOTHYROIDISM: ICD-10-CM

## 2023-08-15 NOTE — TELEPHONE ENCOUNTER
Refill Routing Note   Medication(s) are not appropriate for processing by [facility] for the following reason(s):      Required labs outdated    [facility] action(s):  Defer Care Due:  Labs due            Appointments  past 12m or future 3m with PCP    Date Provider   Last Visit   7/11/2023 Harrison Kerr MD   Next Visit   Visit date not found Harrison Kerr MD   ED visits in past 90 days: 0        Note composed:3:54 PM 08/15/2023

## 2023-08-15 NOTE — TELEPHONE ENCOUNTER
Care Due:                  Date            Visit Type   Department     Provider  --------------------------------------------------------------------------------                                ESTABLISHED                              PATIENT -    [facility] INTERNAL  Last Visit: 07-      [facility]      MEDICINE       Harrison Kerr  Next Visit: None Scheduled  None         None Found                                                            Last  Test          Frequency    Reason                     Performed    Due Date  --------------------------------------------------------------------------------    HBA1C.......  6 months...  dulaglutide..............  08- 02-    [facility] Embedded Care Due Messages. Reference number: [id number].   8/15/2023 3:41:06 PM CDT

## 2023-08-16 RX ORDER — LEVOTHYROXINE SODIUM 112 UG/1
112 TABLET ORAL
Qty: 90 TABLET | Refills: 3 | Status: SHIPPED | OUTPATIENT
Start: 2023-08-16

## 2023-09-06 ENCOUNTER — PATIENT MESSAGE (OUTPATIENT)
Dept: BARIATRICS | Facility: CLINIC | Age: 63
End: 2023-09-06
Payer: COMMERCIAL

## 2023-09-12 ENCOUNTER — PATIENT MESSAGE (OUTPATIENT)
Dept: BARIATRICS | Facility: CLINIC | Age: 63
End: 2023-09-12
Payer: COMMERCIAL

## 2023-09-27 DIAGNOSIS — Z12.31 OTHER SCREENING MAMMOGRAM: ICD-10-CM

## 2023-10-04 ENCOUNTER — PATIENT MESSAGE (OUTPATIENT)
Dept: BARIATRICS | Facility: CLINIC | Age: 63
End: 2023-10-04
Payer: COMMERCIAL

## 2023-10-04 DIAGNOSIS — I10 BENIGN ESSENTIAL HYPERTENSION: ICD-10-CM

## 2023-10-04 RX ORDER — HYDROCHLOROTHIAZIDE 12.5 MG/1
12.5 TABLET ORAL DAILY
Qty: 90 TABLET | Refills: 2 | Status: SHIPPED | OUTPATIENT
Start: 2023-10-04

## 2023-10-06 ENCOUNTER — HOSPITAL ENCOUNTER (OUTPATIENT)
Dept: RADIOLOGY | Facility: OTHER | Age: 63
Discharge: HOME | End: 2023-10-06
Attending: INTERNAL MEDICINE
Payer: COMMERCIAL

## 2023-10-06 DIAGNOSIS — Z12.31 OTHER SCREENING MAMMOGRAM: ICD-10-CM

## 2023-10-06 PROCEDURE — 77067 SCR MAMMO BI INCL CAD: CPT | Mod: 26,,, | Performed by: RADIOLOGY

## 2023-10-06 PROCEDURE — 77067 MAMMO DIGITAL SCREENING BILAT WITH TOMO: ICD-10-PCS | Mod: 26,,, | Performed by: RADIOLOGY

## 2023-10-06 PROCEDURE — 77063 MAMMO DIGITAL SCREENING BILAT WITH TOMO: ICD-10-PCS | Mod: 26,,, | Performed by: RADIOLOGY

## 2023-10-06 PROCEDURE — 77063 BREAST TOMOSYNTHESIS BI: CPT | Mod: 26,,, | Performed by: RADIOLOGY

## 2023-10-06 PROCEDURE — 77067 SCR MAMMO BI INCL CAD: CPT | Mod: TC

## 2023-10-10 ENCOUNTER — PATIENT MESSAGE (OUTPATIENT)
Dept: BARIATRICS | Facility: CLINIC | Age: 63
End: 2023-10-10
Payer: COMMERCIAL

## 2023-10-17 NOTE — PROGRESS NOTES
Subjective     Patient ID: Kim Garcia is a 63 y.o. female.    Chief Complaint: Consult    CC: weight    New pt to me, referred by Harrison Kerr MD  [address]  [id number]  [place] [address]  , with Patient Active Problem List:     Vitamin D deficiency disease     Bariatric surgery status, sleeve 2010- not  seen in Dept since 2013.      Rosacea     Primary hypothyroidism, 1999     BMI 33.0-33.9,adult     Screening for breast cancer     Seasonal allergic rhinitis     Status post cholecystectomy     Osteopenia     Atrophic vaginitis     Cystic mastopathy, unspecified laterality     Menopause     Family history of colonic polyps     Scalp psoriasis      Lab Results       Component                Value               Date                       HGBA1C                   5.1                 08/24/2022                 HGBA1C                   5.0                 02/17/2021                 HGBA1C                   5.3                 11/14/2013            Lab Results       Component                Value               Date                       LDLCALC                  106.6               08/24/2022                 CREATININE               0.8                 08/24/2022              Lab Results       Component                Value               Date                       TSH                      3.064               08/24/2022                 [id number]                  97.01               05/30/2012                 [id number]                  9.3                 05/30/2012                 FREET4                   1.24                11/20/2015                  Current attempts at weight loss:  Bariatric surgery status, sleeve 2010- not  seen in Dept since 2013. Currently back at Preop weight. Walking every other day x 20 min.     Previous diet attempts: Rds here. WW. Noom. IP. Keto. Sugar busters. Nicole galan.     History of medication for loss: Denies.   checked today.     Heaviest weight pre-op:  "228#    Lightest weight post-op: 180#. Weight has just crept up over the years.     Goal weight: 180# would be 50% EWL.       Last eye exam:  1 year ago. No glaucoma.    Provider:    Typical eating patterns:  Retired. Lives with . Pt cooks.   Breakfast: chocolate bar. Protein drink- IP with almond milk and coffee    lunch: leftovers.     dinner: chicken or beef, rice, If out- Armenian food (noodles), mexican, chinese, Ugandan.     snacks: chips,     Beverages: coffee with skim milk. Diet coke. Not much water. ETOH- wine- 1 glass nightly.     Willingness to change:  6/10    Cardiac studies:     Normal sinus rhythm   Normal ECG   When compared with ECG of 22-FEB-2019 09:43,   No significant change was found    BMR: 1426    PBF:  52.2%    Review of Systems       Objective   /80 (BP Location: Left arm, Patient Position: Sitting, BP Method: Large (Manual))   Pulse 68   Ht 5' 4.5" (1.638 m)   Wt 102.3 kg (225 lb 8 oz)   SpO2 98%   BMI 38.11 kg/m²     Physical Exam  Vitals reviewed.   Constitutional:       General: She is not in acute distress.     Appearance: She is well-developed. She is obese.   HENT:      Head: Normocephalic and atraumatic.   Eyes:      General: No scleral icterus.     Pupils: Pupils are equal, round, and reactive to light.   Neck:      Thyroid: No thyromegaly.   Cardiovascular:      Rate and Rhythm: Normal rate.      Heart sounds: Normal heart sounds. No murmur heard.     No friction rub. No gallop.   Pulmonary:      Effort: Pulmonary effort is normal. No respiratory distress.      Breath sounds: Normal breath sounds. No wheezing.   Abdominal:      General: Bowel sounds are normal. There is no distension.      Palpations: Abdomen is soft.      Tenderness: There is no abdominal tenderness.   Musculoskeletal:         General: Normal range of motion.      Cervical back: Normal range of motion and neck supple.   Skin:     General: Skin is warm and dry.      Findings: No erythema. "   Neurological:      Mental Status: She is alert and oriented to person, place, and time.      Cranial Nerves: No cranial nerve deficit.   Psychiatric:         Behavior: Behavior normal.         Judgment: Judgment normal.            Assessment and Plan     1. Class 2 severe obesity with body mass index (BMI) of 35 to 39.9 with serious comorbidity  -     diethylpropion (TENUATE) 75 mg SR tablet; Take 1 tablet (75 mg total) by mouth once daily.  Dispense: 30 tablet; Refill: 1    2. BMI 38.0-38.9,adult  -     Ambulatory referral/consult to Bariatric Medicine    3. S/P laparoscopic sleeve gastrectomy        1. Class 2 severe obesity with body mass index (BMI) of 35 to 39.9 with serious comorbidity    - diethylpropion (TENUATE) 75 mg SR tablet; Take 1 tablet (75 mg total) by mouth once daily.  Dispense: 30 tablet; Refill: 1      Patient warned of common side effects of diethylpropion including anxiety, insomnia, palpitations and increased blood pressure. It was also explained that it is for short-term usage along with diet and exercise, and that stopping the medication without making lifestyle changes will result in regain of weight. Patient states understanding.    Weight loss medications are controlled substances.  They require routine follow up. Prescription or pills that are lost or destroyed will not be replaced.     Check the website www.[employer] for price comparisons and discounts on medications.      Veenome Monae (code 86873)    - To lose weight you want to cut 100% starchy carbohydrates out of your diet (bread, rice, pasta, potatoes, granola, flour, corn, peas, oatmeal, grits, tortillas, crackers, chips) and get  grams of protein.  Aim for 90 grams of protein and 1000 leonid daily.    - No soda, sweet tea, juices, sports drinks or lemonade     -Exercise daily. Add some type of resistance training 2-3 days a week. These can be body weight exercises, light weight or elastic bands. [employer] and [employer] are  great sources for free work out plans and videos.       1000 leonid menu and meal ideas and exercise handouts given.   2. BMI 38.0-38.9,adult    - Ambulatory referral/consult to Bariatric Medicine    3. S/P laparoscopic sleeve gastrectomy  Scheduled with RD. Continue bariatric diet for life.              No follow-ups on file.

## 2023-10-18 ENCOUNTER — OFFICE VISIT (OUTPATIENT)
Dept: BARIATRICS | Facility: CLINIC | Age: 63
End: 2023-10-18
Payer: COMMERCIAL

## 2023-10-18 VITALS
BODY MASS INDEX: 37.57 KG/M2 | DIASTOLIC BLOOD PRESSURE: 80 MMHG | SYSTOLIC BLOOD PRESSURE: 132 MMHG | HEIGHT: 65 IN | OXYGEN SATURATION: 98 % | HEART RATE: 68 BPM | WEIGHT: 225.5 LBS

## 2023-10-18 DIAGNOSIS — E66.01 CLASS 2 SEVERE OBESITY WITH BODY MASS INDEX (BMI) OF 35 TO 39.9 WITH SERIOUS COMORBIDITY: Primary | ICD-10-CM

## 2023-10-18 DIAGNOSIS — Z98.84 S/P LAPAROSCOPIC SLEEVE GASTRECTOMY: ICD-10-CM

## 2023-10-18 PROCEDURE — 99999 PR PBB SHADOW E&M-EST. PATIENT-LVL IV: ICD-10-PCS | Mod: PBBFAC,,, | Performed by: INTERNAL MEDICINE

## 2023-10-18 PROCEDURE — 99999 PR PBB SHADOW E&M-EST. PATIENT-LVL IV: CPT | Mod: PBBFAC,,, | Performed by: INTERNAL MEDICINE

## 2023-10-18 PROCEDURE — 99215 OFFICE O/P EST HI 40 MIN: CPT | Mod: S$GLB,,, | Performed by: INTERNAL MEDICINE

## 2023-10-18 PROCEDURE — 99215 PR OFFICE/OUTPT VISIT, EST, LEVL V, 40-54 MIN: ICD-10-PCS | Mod: S$GLB,,, | Performed by: INTERNAL MEDICINE

## 2023-10-18 RX ORDER — DIETHYLPROPION HYDROCHLORIDE 75 MG/1
75 TABLET, EXTENDED RELEASE ORAL DAILY
Qty: 30 TABLET | Refills: 1 | Status: SHIPPED | OUTPATIENT
Start: 2023-10-18

## 2023-10-18 NOTE — PATIENT INSTRUCTIONS
PLEASE ARRIVE 15-20 min EARLY FOR YOUR APPOINTMENTS. This allows us to perform all of the services for your appointment in a timely fashion. Arriving late for your appointment not only decreases the time available for the doctor to see you, it also leads to delays for every other patient scheduled after you. Patients arriving after their appointment time may be asked to wait until all patients that arrived on time are seen, and/or there is another available slot for you to be worked in. When this is not possible, you may be asked to reschedule.   Thank you for your consideration in this matter.         Patient warned of common side effects of diethylpropion including anxiety, insomnia, palpitations and increased blood pressure. It was also explained that it is for short-term usage along with diet and exercise, and that stopping the medication without making lifestyle changes will result in regain of weight. Patient states understanding.    Weight loss medications are controlled substances.  They require routine follow up. Prescription or pills that are lost or destroyed will not be replaced.     Check the website www.INRIX for price comparisons and discounts on medications.      KDS Monae (code 75938)    - To lose weight you want to cut 100% starchy carbohydrates out of your diet (bread, rice, pasta, potatoes, granola, flour, corn, peas, oatmeal, grits, tortillas, crackers, chips) and get  grams of protein.  Aim for 90 grams of protein and 1000 leonid daily.    - No soda, sweet tea, juices, sports drinks or lemonade     -Exercise daily. Add some type of resistance training 2-3 days a week. These can be body weight exercises, light weight or elastic bands. [employer]novo and [employer] are great sources for free work out plans and videos.       - Premier Protein (Chocolate, Bananas & Cream, Strawberries & Cream, Vanilla) Danilo or [employer]    - Syntrax [place] from [employer], www.bariatricadSerene Oncologyage.com,  www.bariatricchoice.com. (LACTOSE FREE)    - BiPro - Amazon.com (LACTOSE FREE)    - Veggetti Pro from [employer] Life, Amazon, [employer]    - www.pinterest.com (cauliflower, cloud bread, quest bar cookies, eggplant, zucchini, zucchini noodles, crustless quiche, no carb meals, taco lettuce boats)    - http://therosa elena.Matatena Games/    5-Day Menu Plan: 800-1000 Calories    DAY 1     Breakfast  4 slices deli turkey  Small apple    Snack  ¼ cup almonds    Lunch  Lean hamburger todd  1 cup green beans    Dinner  2 skinless chicken thighs  1 cup cooked carrots    Snack  SF jello    DAY 2    Breakfast  2 eggs with 2 tbsp salsa    Snack  2 slices deli turkey  ½ cup Peaches canned (in its own juice)    Lunch  [place]: Deli chicken and mustard   Pear cup (no sugar added)    Dinner  Baked fish  1 cup cooked yellow squash    Snack  SF popsicle            DAY 3    Breakfast   Protein drink: 1 scoop whey powder + 6oz soy milk    Snack  ¼ cup almonds    Lunch  Tuna Salad: 3oz canned tuna in water, 1 egg, and 1 tsp light jeffery)  Pineapple cup (no sugar added)    Dinner  Baked chicken breast  1 cup grilled eggplant    Snack  8oz Chocolate Soy Milk      DAY 4    Breakfast  2 eggs, turkey sausage todd    Snack  4 slices deli turkey    Lunch    Snack  1/4 cup almonds  Peach cup (no sugar added)    Dinner  3oz baked pork chop  1 cup cooked green beans                    DAY 5    Breakfast  Protein drink: 1 scoop whey powder + 6oz soy milk    Snack   ¼ cup almonds  1 apple    Lunch  1 cup Chicken salad: (3oz canned chicken in water, 1 egg, 1 tsp light jeffery)    Snack  4 slices deli turkey  Fruit cup (no sugar added)    Dinner  Omelet: 2 eggs, grilled shrimp, bell pepper and onion       Meal Ideas for Regular Bariatric Diet  *Recipes and products available at www.bariatriceating.com      Breakfast: (15-20g protein)    - Egg white omelet: 2 egg whites or ½ cup Egg Beaters. (Optional proteins: cheese, shrimp, black beans,  chicken, sliced turkey) (Optional veggies: tomatoes, salsa, spinach, mushrooms, onions, green peppers, or small slice avocado)     - Egg and sausage: 1 egg or ¼ cup Egg Beaters (any variety), with 1 melisa or 2 links of Turkey sausage or Veggie breakfast sausage (UpTap or Newsgrape)    - Crust-less breakfast quiche: To make a glass pie dish, mix 4oz part skim Ricotta, 1 cup skim milk, and 2 eggs as your base. Add protein: shredded cheese, sliced lean ham or turkey, turkey quinn/sausage. Add veggies: tomato, onion, green onion, mushroom, green pepper, spinach, etc.    - Yogurt parfait: Mix 1 - 6oz container Dannon Light N Fit vanilla yogurt, with ¼ cup Kashi Go Lean cereal    - Cottage cheese and fruit: ½ cup part-skim cottage cheese or ricotta cheese topped with fresh fruit or sugar free preserves     - Barb Cintron's Vanilla Egg custard* (add 2 Tbsp instant coffee granules to make Cappuccino Custard*)    - Hi-Protein café latte (skim milk, decaf coffee, 1 scoop protein powder). Optional to add Sugar free syrup or extract flavoring.    Lunch: (20-30g protein)    - ½ cup Black bean soup (Homemade or Progresso), with ¼ cup shredded low-fat cheese. Top with chopped tomato or fresh salsa.     - Lean deli turkey breast and low-fat sliced cheese, mustard or light jeffery to moisten, rolled up together, or wrapped in a Toni lettuce leaf    - Chicken salad made from dinner leftovers, moisten with low-fat salad dressing or light jeffery. Also try leftover salmon, shrimp, tuna or boiled eggs. Serve ½ cup over dark green salad    - Fat-free canned refried beans, topped with ¼ cup shredded low-fat cheese. Top with chopped tomato or fresh salsa.     - Greek salad: Top mixed greens with 1-2oz grilled chicken, tomatoes, red onions, 2-3 kalamata olives, and sprinkle lightly with feta cheese. Spritz with Balsamic vinegar to taste.     - Crust-less lunch quiche: To make a glass pie dish, mix 4oz part skim Ricotta, 1 cup skim milk,  and 2 eggs as your base. Add protein: shredded cheese, sliced lean ham or turkey, shrimp, chicken. Add veggies: tomato, onion, green onion, mushroom, green pepper, spinach, artichoke, broccoli, etc.    - Pizza bake: tomato sauce, low-fat shredded mozzarella and turkey pepperoni or Bulgarian quinn. Add any veggies.    - Cucumber crab bites: Spread ¼ cup crab dip (lump crabmeat + light cream cheese and green onions) over sliced cucumber.     - Chicken with light spinach and artichoke dip*: Puree in : 6oz cooked and drained spinach, 2 cloves garlic, 1 can cannelloni beans, ½ cup chopped green onions, 1 can drained artichoke hearts (not marinated in oil), lemon juice and basil. Mix in 2oz chopped up chicken.    Supper: (20-30g protein)    - Serve grilled fish over dark green salad tossed with low-fat dressing, served with grilled asparagus brandt     - Rotisserie chicken salad: served with sliced strawberries, walnuts, fat-free feta cheese crumbles and 1 tbsp Ulrichs Own Light Raspberry [place] Vinaigrette    - Shrimp cocktail: Dip cold boiled shrimp in homemade low-sugar cocktail sauce (1/2 cup Marli One Carb ketchup, 2 tbsp horseradish, 1/4 tsp hot sauce, 1 tsp Worcestershire sauce, 1 tbsp freshly-squeezed lemon juice). Serve with dark green salad, walnuts, and crumbled blue cheese drizzled with olive oil and Balsamic vinegar    - Tuna Melt: Spread tuna salad onto 2 thick slices of tomato. Top with low-fat cheese and broil until cheese is melted. May also be made with chicken salad of shrimp salad. [place] with different types of cheeses.    - Homemade low-fat Chili using extra lean ground beef or ground turkey. Top with shredded cheese and salsa as desired. May add dollop fat-free sour cream if desired    - Dinner Omelet with shrimp or chicken and onion, green peppers and chives.    - No noodle lasagna: Use sliced zucchini or eggplant in place of noodles.  Layer with part skim ricotta cheese and low  sugar meat sauce (use very lean ground beef or ground turkey).    - Mexican chicken bake: Bake chunks of chicken breast or thigh with taco seasoning, Pace brand enchilada sauce, green onions and low-fat cheese. Serve with ¼ cup black beans or fat free refried beans topped with chopped tomatoes or salsa.    - Manuel frozen meatballs, simmered in Classico Marinara sauce. Different flavors of salsa or spaghetti sauce create different dishes! Sprinkle with parmesan cheese. Serve with grilled or steamed veggies, or a dark green salad.    - Simmer boneless skinless chicken thigh chunks in Classico Marinara sauce or roasted salsa until tender with chopped onion, bell pepper, garlic, mushrooms, spinach, etc.     - Hamburger, without the bun, dressed the way you like. Served with grilled or steamed veggies.    - Eggplant parmesan: Bake slices of eggplant at 350 degrees for 15 minutes. Layer tomato sauce, sliced eggplant and low-fat mozzarella cheese in a baking dish and cover with foil. Bake 30-40 more minutes or until bubbly. Uncover and bake at 400 degrees for about 15 more minutes, or until top is slightly crisp.    - Fish tacos: grilled/baked white fish, wrapped in Toni lettuce leaf, topped with salsa, shredded low-fat cheese, and light coleslaw.    Snacks: (100-200 calories; >5g protein)    - 1 low-fat cheese stick with 8 cherry tomatoes or 1 serving fresh fruit  - 4 thin slices fat-free turkey breast and 1 slice low-fat cheese  - 4 thin slices fat-free honey ham with wedge of melon  - 1/4 cup unsalted nuts with ½ cup fruit  - 6-oz container Dannon Light n Fit vanilla yogurt, topped with 1oz unsalted nuts         - apple, celery or baby carrots spread with 2 Tbsp natural peanut butter or almond butter   - apple slices with 1 oz slice low-fat cheese  - celery, cucumber, bell pepper or baby carrots dipped in ¼ cup hummus bean spread or light spinach and artichoke dip (*recipe in lunch section)  - 100 calorie bag  microwave light popcorn with 3 tbsp grated parmesan cheese  - Lito Links Beef Steak - 14g protein! (similar to beef jerky)  - 2 wedges Laughing Cow - Light Herb & Garlic Cheese with sliced cucumber or green bell pepper  - 1/2 cup low-fat cottage cheese with ¼ cup fruit or ¼ cup salsa  - RTD Protein drinks: Atkins, Low Carb Slim Fast, EAS light, Muscle Milk Light, etc.  - Homemade Protein drinks: [facility] Soy95, Isopure, Nectar, UNJURY, Whey Gourmet, etc. Mix 1 scoop powder with 8oz skim/1% milk or light soymilk.  - Protein bars: Atkins, EAS, Pure Protein, Think Thin, Detour, etc. Must have 0-4 grams sugar - Read the label.    Takeout Options: No more than twice/week  Deli - Salads (no pasta or rice), meats, cheeses. Roasted chicken. Lox (salmon)    Mexican - Platters which don't include tortillas, chips, or rice. Go easy on the beans. Example: Fajitas without the tortillas. Ask the  not to bring chips to the table if they are too tempting.    Greek - Meat or fish and vegetable, but no bread or rice. Including hummus, baba ganoush, etc, is OK. Most sit-down Greek restaurants can provide you with cucumber slices for dipping instead of og bread.    Fast Food (Avoid as much as possible) - Salads (no croutons and limit salad dressing to 2 tbsp), grilled chicken sandwich without the bun and ask for no jeffery. Sheryls low fat chili or Taco Bell pintos and cheese.    BBQ - The meats are fine if you ask for sauces on the side, but most of the traditional side dishes are loaded with carbs. Alec slaw, baked beans and BBQ sauce are typically made with sugar.    Chinese - Nothing deep-fried, no rice or noodles. Many Chinese sauces have starch and sugar in them, so you'll have to use your judgement. If you find that these sauces trigger cravings, or cause Dumping, you can ask for the sauce to be made without sugar or just use soy sauce.    Exercise should be some cardiovascular and some resistance training(see  below).      STRENGTHENING  An adequate resistance training program could include the following types of exercise, focusing on the major muscle groups. One can use 5 to 10 pound dumbbells for those exercises that require the use of weight. At home, one can use a household item that provides a comfortable weight, such as a milk carton or beverage container. These exercises can also be performed without weights. Add some type of resistance training 2-3 days a week. These can be body weight exercises, light weight or elastic bands. Movli and [employer] are great sources for free work out plans and videos.       Chest (Bench Press): Hold the weights with your hands. Lying on a flat surface, with knees bent and feet flat, slowly bring the weights to the chest area with palms facing upward. Begin to exhale and press the weights up, fully extending the arms, and keeping them above your eyes. Inhale as you lower them to the starting position and repeat the movement.  Back (Bent-Over Row): Start by placing your feet shoulder-width apart.  a weight with each hand just outside the knees. Keeping the back straight and the knees flexed, slightly bend forward at the waist. Let the arms hang naturally while holding the weights. From this starting position, pull the weights to the lower abdomen, keeping the elbows close to the body. Exhale as you pull. Return to the starting position, inhaling as you go.  Arms (Bicep Curls): Hold a weight in each hand, with elbows at your sides, palms facing forward. The back should be straight, the chest flared outward. Begin to bend your right arm up first while exhaling, keeping the elbow totally stationary. Wait until the right arm goes completely down to the fully extended position, and then begin to curl the left arm. Each arm curled completes one full repetition.  Shoulders (Lateral Raises): Place the feet a few inches apart with the knees bent slightly. Keep the back erect as you lean  "forward slightly. With the weights in front of your thighs and palms facing together, begin to slowly raise them up to the side until parallel with the floor. Lower the weights to your thighs, and repeat.  Legs (Stiff Leg Dead Lift): Start by standing straight, holding the weights close to your sides, nearly touching your thighs. Keep the weights close to the body--this protects the back. The back must stay straight. Bend at the waist as far forward as you comfortably can while keeping your legs straight, and begin to feel the pull in your hamstrings as you lower the weights toward the ground. Slowly return to the starting position, keeping the back straight.  Legs (Dumbbell Lunge): Hold a weight in each hand, arms hanging at your sides. Step out with one leg, keeping the back straight. It is important to step out far enough so that the knee does not extend over the toe. This puts too much stress on the knee. Go down far enough so that the opposite knee nearly touches the ground. Keep this stance and repeat the lunging motion for several repetitions.  Abdominals: Do not perform standard sit-ups as these could hurt the lower back. Rather, focus on the following 2 exercises: (1) Obliques--Lie on your back with the knees flexed in the bent sit-up position. From this position, bring both knees down to the ground. With the back remaining flat, begin to flex your body toward your toes ("crunch"). Bring your shoulders up off the ground, but go slowly, controlling your momentum. Repeat this for 10 to 15 times. (2) Seated bench kicks/niki knives--Sit on the end of a bench or chair with the hands placed behind the buttocks. Begin to kick the legs outward with the knees bent slightly--at the same time, lean back to extend the torso. Come back to the beginning position and repeat this motion 10 to 15 times.        "

## 2023-11-14 ENCOUNTER — PATIENT MESSAGE (OUTPATIENT)
Dept: BARIATRICS | Facility: CLINIC | Age: 63
End: 2023-11-14
Payer: COMMERCIAL

## 2023-12-12 ENCOUNTER — PATIENT MESSAGE (OUTPATIENT)
Dept: BARIATRICS | Facility: CLINIC | Age: 63
End: 2023-12-12
Payer: COMMERCIAL

## 2023-12-13 ENCOUNTER — PATIENT MESSAGE (OUTPATIENT)
Dept: BARIATRICS | Facility: CLINIC | Age: 63
End: 2023-12-13
Payer: COMMERCIAL

## 2024-01-09 ENCOUNTER — PATIENT MESSAGE (OUTPATIENT)
Dept: BARIATRICS | Facility: CLINIC | Age: 64
End: 2024-01-09
Payer: COMMERCIAL

## 2024-02-14 ENCOUNTER — PATIENT MESSAGE (OUTPATIENT)
Dept: BARIATRICS | Facility: CLINIC | Age: 64
End: 2024-02-14
Payer: COMMERCIAL

## 2024-02-20 ENCOUNTER — PATIENT MESSAGE (OUTPATIENT)
Dept: BARIATRICS | Facility: CLINIC | Age: 64
End: 2024-02-20
Payer: COMMERCIAL

## 2024-02-29 ENCOUNTER — PATIENT MESSAGE (OUTPATIENT)
Dept: BARIATRICS | Facility: CLINIC | Age: 64
End: 2024-02-29
Payer: COMMERCIAL

## 2024-03-06 ENCOUNTER — PATIENT MESSAGE (OUTPATIENT)
Dept: BARIATRICS | Facility: CLINIC | Age: 64
End: 2024-03-06
Payer: COMMERCIAL

## 2024-04-03 ENCOUNTER — PATIENT MESSAGE (OUTPATIENT)
Dept: BARIATRICS | Facility: CLINIC | Age: 64
End: 2024-04-03
Payer: COMMERCIAL

## 2024-04-09 ENCOUNTER — PATIENT MESSAGE (OUTPATIENT)
Dept: BARIATRICS | Facility: CLINIC | Age: 64
End: 2024-04-09
Payer: COMMERCIAL

## 2024-05-14 ENCOUNTER — PATIENT MESSAGE (OUTPATIENT)
Dept: BARIATRICS | Facility: CLINIC | Age: 64
End: 2024-05-14
Payer: COMMERCIAL

## 2024-05-23 ENCOUNTER — OFFICE VISIT (OUTPATIENT)
Dept: URGENT CARE | Facility: CLINIC | Age: 64
End: 2024-05-23
Payer: COMMERCIAL

## 2024-05-23 VITALS
HEART RATE: 91 BPM | TEMPERATURE: 98 F | WEIGHT: 198 LBS | HEIGHT: 65 IN | DIASTOLIC BLOOD PRESSURE: 82 MMHG | RESPIRATION RATE: 18 BRPM | OXYGEN SATURATION: 97 % | BODY MASS INDEX: 32.99 KG/M2 | SYSTOLIC BLOOD PRESSURE: 124 MMHG

## 2024-05-23 DIAGNOSIS — H00.011 HORDEOLUM OF RIGHT UPPER EYELID, UNSPECIFIED HORDEOLUM TYPE: Primary | ICD-10-CM

## 2024-05-23 PROCEDURE — 99213 OFFICE O/P EST LOW 20 MIN: CPT | Mod: S$GLB,,, | Performed by: NURSE PRACTITIONER

## 2024-05-23 RX ORDER — ERYTHROMYCIN 5 MG/G
OINTMENT OPHTHALMIC EVERY 4 HOURS
Qty: 3.5 G | Refills: 0 | Status: SHIPPED | OUTPATIENT
Start: 2024-05-23 | End: 2024-05-30

## 2024-05-23 NOTE — PATIENT INSTRUCTIONS
Hordeolum of right upper eyelid, unspecified hordeolum type    -     erythromycin (ROMYCIN) ophthalmic ointment; Place into the right eye every 4 (four) hours. for 7 days  Dispense: 3.5 g; Refill: 0      Keep eyes cleaned.    Avoid sharing towels while infection persist.    Disinfect any glasses/sunglasses that may have come in contact with the eye.      Disinfect frequently surfaces such as light switches, countertops, remote controls, cell phones.    Do not wear your contact lens ( if you use them) for at least 5 days after you stop having symptoms and are rechecked by your doctor.   Throw away the contacts, contact solution and carrying case you were using and start with new material.     If you develop increase eye symptoms or change in your vision seek medical care immediately either with your ophthalomologist or the ER or return here.

## 2024-05-23 NOTE — PROGRESS NOTES
"Subjective:      Patient ID: Kim Garcia is a 63 y.o. female.    Vitals:  height is 5' 4.5" (1.638 m) and weight is 89.8 kg (198 lb). Her oral temperature is 98 °F (36.7 °C). Her blood pressure is 124/82 and her pulse is 91. Her respiration is 18 and oxygen saturation is 97%.     Chief Complaint: Eye Problem      Pt is a 62 yo female presenting with right eyelid edema and crusting.  Onset of symptoms was 2 or so days ago.      Eye Problem   The right eye is affected. This is a new problem. The problem occurs constantly. The problem has been unchanged. The patient is experiencing no pain. Associated symptoms include an eye discharge (crusting) and eye redness (right upper eyelid). Pertinent negatives include no blurred vision, double vision, fever, itching, nausea, photophobia or vomiting.       Constitution: Negative for chills, fatigue and fever.   HENT:  Negative for ear pain, congestion, sinus pain and sore throat.    Cardiovascular:  Negative for chest pain.   Eyes:  Positive for eye discharge (crusting), eye redness (right upper eyelid) and eyelid swelling (right upper eyelid). Negative for eye trauma, foreign body in eye, eye itching, eye pain, photophobia, vision loss, double vision and blurred vision.   Respiratory:  Negative for cough, sputum production and shortness of breath.    Gastrointestinal:  Negative for nausea, vomiting and diarrhea.   Musculoskeletal:  Negative for muscle ache.   Neurological:  Negative for headaches.      Objective:     Physical Exam   Constitutional: She is oriented to person, place, and time.   HENT:   Head: Normocephalic.   Ears:   Right Ear: Hearing and external ear normal. No no drainage, swelling or tenderness. Tympanic membrane is not erythematous, not retracted and not bulging. No middle ear effusion.   Left Ear: Hearing and external ear normal. No no drainage, swelling or tenderness. Tympanic membrane is not erythematous, not retracted and not bulging.  No middle " ear effusion.   Nose: Nose normal. No mucosal edema, rhinorrhea or purulent discharge. Right sinus exhibits no maxillary sinus tenderness and no frontal sinus tenderness. Left sinus exhibits no maxillary sinus tenderness and no frontal sinus tenderness.   Mouth/Throat: Uvula is midline, oropharynx is clear and moist and mucous membranes are normal. No uvula swelling. No oropharyngeal exudate, posterior oropharyngeal edema or posterior oropharyngeal erythema.   Eyes: No visual field deficit is present. Right eye exhibits hordeolum. Right conjunctiva is not injected. Right conjunctiva has no hemorrhage. Left conjunctiva is not injected. Left conjunctiva has no hemorrhage. No scleral icterus. Right eye exhibits normal extraocular motion and no nystagmus. Left eye exhibits normal extraocular motion and no nystagmus.     Extraocular movement intact vision grossly intact gaze aligned appropriately periorbital hyperpigmentation  Cardiovascular: Normal rate and regular rhythm.   Pulmonary/Chest: Effort normal and breath sounds normal.   Neurological: She is alert and oriented to person, place, and time.   Skin: Skin is warm and dry.   Nursing note and vitals reviewed.      Assessment:     1. Hordeolum of right upper eyelid, unspecified hordeolum type        Plan:       Hordeolum of right upper eyelid, unspecified hordeolum type  -     erythromycin (ROMYCIN) ophthalmic ointment; Place into the right eye every 4 (four) hours. for 7 days  Dispense: 3.5 g; Refill: 0      Patient Instructions     Hordeolum of right upper eyelid, unspecified hordeolum type    -     erythromycin (ROMYCIN) ophthalmic ointment; Place into the right eye every 4 (four) hours. for 7 days  Dispense: 3.5 g; Refill: 0      Keep eyes cleaned.    Avoid sharing towels while infection persist.    Disinfect any glasses/sunglasses that may have come in contact with the eye.      Disinfect frequently surfaces such as light switches, countertops, remote controls,  cell phones.    Do not wear your contact lens ( if you use them) for at least 5 days after you stop having symptoms and are rechecked by your doctor.   Throw away the contacts, contact solution and carrying case you were using and start with new material.     If you develop increase eye symptoms or change in your vision seek medical care immediately either with your ophthalomologist or the ER or return here.

## 2024-05-30 ENCOUNTER — PATIENT MESSAGE (OUTPATIENT)
Dept: ADMINISTRATIVE | Facility: HOSPITAL | Age: 64
End: 2024-05-30
Payer: COMMERCIAL

## 2024-06-11 ENCOUNTER — PATIENT MESSAGE (OUTPATIENT)
Dept: BARIATRICS | Facility: CLINIC | Age: 64
End: 2024-06-11
Payer: COMMERCIAL

## 2024-07-03 ENCOUNTER — PATIENT MESSAGE (OUTPATIENT)
Dept: BARIATRICS | Facility: CLINIC | Age: 64
End: 2024-07-03
Payer: COMMERCIAL

## 2024-07-09 ENCOUNTER — PATIENT MESSAGE (OUTPATIENT)
Dept: BARIATRICS | Facility: CLINIC | Age: 64
End: 2024-07-09
Payer: COMMERCIAL

## 2024-07-09 DIAGNOSIS — I10 BENIGN ESSENTIAL HYPERTENSION: ICD-10-CM

## 2024-07-09 NOTE — TELEPHONE ENCOUNTER
Refill Routing Note   Medication(s) are not appropriate for processing by [facility] for the following reason(s):        Required labs outdated    [facility] action(s):  Defer   Requires appointment : Yes     Requires labs : Yes             Appointments  past 12m or future 3m with PCP    Date Provider   Last Visit   7/11/2023 Harrison Kerr MD   Next Visit   Visit date not found Harrison Kerr MD   ED visits in past 90 days: 0        Note composed:1:34 PM 07/09/2024

## 2024-07-09 NOTE — TELEPHONE ENCOUNTER
Care Due:                  Date            Visit Type   Department     Provider  --------------------------------------------------------------------------------                                ESTABLISHED                              PATIENT -    [facility] INTERNAL  Last Visit: 07-      [facility]      JAVIER Kerr  Next Visit: None Scheduled  None         None Found                                                            Last  Test          Frequency    Reason                     Performed    Due Date  --------------------------------------------------------------------------------    Office Visit  15 months..  hydroCHLOROthiazide,       07-   10-                             levothyroxine............    CMP.........  12 months..  hydroCHLOROthiazide......  08- 08-    TSH.........  12 months..  levothyroxine............  08- 08-    [facility] Embedded Care Due Messages. Reference number: [id number].   7/09/2024 6:19:49 AM CDT

## 2024-07-10 ENCOUNTER — PATIENT MESSAGE (OUTPATIENT)
Dept: INTERNAL MEDICINE | Facility: CLINIC | Age: 64
End: 2024-07-10
Payer: COMMERCIAL

## 2024-07-10 RX ORDER — HYDROCHLOROTHIAZIDE 12.5 MG/1
12.5 TABLET ORAL DAILY
Qty: 90 TABLET | Refills: 0 | Status: SHIPPED | OUTPATIENT
Start: 2024-07-10

## 2024-08-07 ENCOUNTER — OFFICE VISIT (OUTPATIENT)
Dept: INTERNAL MEDICINE | Facility: CLINIC | Age: 64
End: 2024-08-07
Attending: INTERNAL MEDICINE
Payer: MEDICARE

## 2024-08-07 ENCOUNTER — LAB VISIT (OUTPATIENT)
Dept: LAB | Facility: OTHER | Age: 64
End: 2024-08-07
Attending: INTERNAL MEDICINE
Payer: MEDICARE

## 2024-08-07 ENCOUNTER — PATIENT MESSAGE (OUTPATIENT)
Dept: INTERNAL MEDICINE | Facility: CLINIC | Age: 64
End: 2024-08-07

## 2024-08-07 VITALS
HEART RATE: 82 BPM | WEIGHT: 194 LBS | SYSTOLIC BLOOD PRESSURE: 140 MMHG | BODY MASS INDEX: 33.12 KG/M2 | OXYGEN SATURATION: 99 % | DIASTOLIC BLOOD PRESSURE: 86 MMHG | HEIGHT: 64 IN

## 2024-08-07 DIAGNOSIS — Z98.84 HISTORY OF GASTRIC BYPASS: ICD-10-CM

## 2024-08-07 DIAGNOSIS — Z00.00 ANNUAL PHYSICAL EXAM: Primary | ICD-10-CM

## 2024-08-07 DIAGNOSIS — R23.4 CHANGES IN SKIN TEXTURE: ICD-10-CM

## 2024-08-07 DIAGNOSIS — R79.9 ABNORMAL FINDING OF BLOOD CHEMISTRY, UNSPECIFIED: ICD-10-CM

## 2024-08-07 DIAGNOSIS — Z98.84 BARIATRIC SURGERY STATUS: ICD-10-CM

## 2024-08-07 DIAGNOSIS — R20.1 HYPOESTHESIA OF SKIN: ICD-10-CM

## 2024-08-07 DIAGNOSIS — R20.8 OTHER DISTURBANCES OF SKIN SENSATION: ICD-10-CM

## 2024-08-07 DIAGNOSIS — E55.9 VITAMIN D DEFICIENCY DISEASE: ICD-10-CM

## 2024-08-07 DIAGNOSIS — Z78.0 ASYMPTOMATIC MENOPAUSAL STATE: ICD-10-CM

## 2024-08-07 DIAGNOSIS — Z00.00 ANNUAL PHYSICAL EXAM: ICD-10-CM

## 2024-08-07 DIAGNOSIS — M85.80 OSTEOPENIA, UNSPECIFIED LOCATION: ICD-10-CM

## 2024-08-07 LAB
ALBUMIN SERPL BCP-MCNC: 4 G/DL (ref 3.5–5.2)
ALP SERPL-CCNC: 91 U/L (ref 55–135)
ALT SERPL W/O P-5'-P-CCNC: 14 U/L (ref 10–44)
ANION GAP SERPL CALC-SCNC: 7 MMOL/L (ref 8–16)
AST SERPL-CCNC: 23 U/L (ref 10–40)
BASOPHILS # BLD AUTO: 0.04 K/UL (ref 0–0.2)
BASOPHILS NFR BLD: 0.6 % (ref 0–1.9)
BILIRUB SERPL-MCNC: 0.7 MG/DL (ref 0.1–1)
BUN SERPL-MCNC: 9 MG/DL (ref 8–23)
CALCIUM SERPL-MCNC: 9.5 MG/DL (ref 8.7–10.5)
CHLORIDE SERPL-SCNC: 102 MMOL/L (ref 95–110)
CHOLEST SERPL-MCNC: 184 MG/DL (ref 120–199)
CHOLEST/HDLC SERPL: 2.7 {RATIO} (ref 2–5)
CO2 SERPL-SCNC: 30 MMOL/L (ref 23–29)
CREAT SERPL-MCNC: 0.8 MG/DL (ref 0.5–1.4)
DIFFERENTIAL METHOD BLD: NORMAL
EOSINOPHIL # BLD AUTO: 0.2 K/UL (ref 0–0.5)
EOSINOPHIL NFR BLD: 2.9 % (ref 0–8)
ERYTHROCYTE [DISTWIDTH] IN BLOOD BY AUTOMATED COUNT: 13.7 % (ref 11.5–14.5)
EST. GFR  (NO RACE VARIABLE): >60 ML/MIN/1.73 M^2
ESTIMATED AVG GLUCOSE: 91 MG/DL (ref 68–131)
FERRITIN SERPL-MCNC: 172 NG/ML (ref 20–300)
FOLATE SERPL-MCNC: 3.5 NG/ML (ref 4–24)
GLUCOSE SERPL-MCNC: 88 MG/DL (ref 70–110)
HBA1C MFR BLD: 4.8 % (ref 4–5.6)
HCT VFR BLD AUTO: 40 % (ref 37–48.5)
HDLC SERPL-MCNC: 67 MG/DL (ref 40–75)
HDLC SERPL: 36.4 % (ref 20–50)
HGB BLD-MCNC: 13.2 G/DL (ref 12–16)
IMM GRANULOCYTES # BLD AUTO: 0.01 K/UL (ref 0–0.04)
IMM GRANULOCYTES NFR BLD AUTO: 0.2 % (ref 0–0.5)
IRON SERPL-MCNC: 85 UG/DL (ref 30–160)
LDLC SERPL CALC-MCNC: 105.2 MG/DL (ref 63–159)
LYMPHOCYTES # BLD AUTO: 1.8 K/UL (ref 1–4.8)
LYMPHOCYTES NFR BLD: 28.4 % (ref 18–48)
MCH RBC QN AUTO: 30.9 PG (ref 27–31)
MCHC RBC AUTO-ENTMCNC: 33 G/DL (ref 32–36)
MCV RBC AUTO: 94 FL (ref 82–98)
MONOCYTES # BLD AUTO: 0.5 K/UL (ref 0.3–1)
MONOCYTES NFR BLD: 8.4 % (ref 4–15)
NEUTROPHILS # BLD AUTO: 3.7 K/UL (ref 1.8–7.7)
NEUTROPHILS NFR BLD: 59.5 % (ref 38–73)
NONHDLC SERPL-MCNC: 117 MG/DL
NRBC BLD-RTO: 0 /100 WBC
PLATELET # BLD AUTO: 253 K/UL (ref 150–450)
PMV BLD AUTO: 10 FL (ref 9.2–12.9)
POTASSIUM SERPL-SCNC: 3.7 MMOL/L (ref 3.5–5.1)
PROT SERPL-MCNC: 7.1 G/DL (ref 6–8.4)
RBC # BLD AUTO: 4.27 M/UL (ref 4–5.4)
SATURATED IRON: 28 % (ref 20–50)
SODIUM SERPL-SCNC: 139 MMOL/L (ref 136–145)
TOTAL IRON BINDING CAPACITY: 303 UG/DL (ref 250–450)
TRANSFERRIN SERPL-MCNC: 205 MG/DL (ref 200–375)
TRIGL SERPL-MCNC: 59 MG/DL (ref 30–150)
TSH SERPL DL<=0.005 MIU/L-ACNC: 2.7 UIU/ML (ref 0.4–4)
VIT B12 SERPL-MCNC: 346 PG/ML (ref 210–950)
WBC # BLD AUTO: 6.16 K/UL (ref 3.9–12.7)

## 2024-08-07 PROCEDURE — 80053 COMPREHEN METABOLIC PANEL: CPT | Performed by: INTERNAL MEDICINE

## 2024-08-07 PROCEDURE — 80061 LIPID PANEL: CPT | Performed by: INTERNAL MEDICINE

## 2024-08-07 PROCEDURE — 82746 ASSAY OF FOLIC ACID SERUM: CPT | Performed by: INTERNAL MEDICINE

## 2024-08-07 PROCEDURE — 85025 COMPLETE CBC W/AUTO DIFF WBC: CPT | Performed by: INTERNAL MEDICINE

## 2024-08-07 PROCEDURE — 36415 COLL VENOUS BLD VENIPUNCTURE: CPT | Performed by: INTERNAL MEDICINE

## 2024-08-07 PROCEDURE — G2211 COMPLEX E/M VISIT ADD ON: HCPCS | Mod: S$PBB,,, | Performed by: INTERNAL MEDICINE

## 2024-08-07 PROCEDURE — 82728 ASSAY OF FERRITIN: CPT | Performed by: INTERNAL MEDICINE

## 2024-08-07 PROCEDURE — 84425 ASSAY OF VITAMIN B-1: CPT | Performed by: INTERNAL MEDICINE

## 2024-08-07 PROCEDURE — 84443 ASSAY THYROID STIM HORMONE: CPT | Performed by: INTERNAL MEDICINE

## 2024-08-07 PROCEDURE — 84466 ASSAY OF TRANSFERRIN: CPT | Performed by: INTERNAL MEDICINE

## 2024-08-07 PROCEDURE — 83036 HEMOGLOBIN GLYCOSYLATED A1C: CPT | Performed by: INTERNAL MEDICINE

## 2024-08-07 PROCEDURE — 99999 PR PBB SHADOW E&M-EST. PATIENT-LVL III: CPT | Mod: PBBFAC,,, | Performed by: INTERNAL MEDICINE

## 2024-08-07 PROCEDURE — 99214 OFFICE O/P EST MOD 30 MIN: CPT | Mod: S$PBB,,, | Performed by: INTERNAL MEDICINE

## 2024-08-07 PROCEDURE — 82607 VITAMIN B-12: CPT | Performed by: INTERNAL MEDICINE

## 2024-08-12 ENCOUNTER — PATIENT MESSAGE (OUTPATIENT)
Dept: BARIATRICS | Facility: CLINIC | Age: 64
End: 2024-08-12
Payer: MEDICARE

## 2024-08-13 LAB — VIT B1 BLD-MCNC: 47 UG/L (ref 38–122)

## 2024-08-21 ENCOUNTER — HOSPITAL ENCOUNTER (OUTPATIENT)
Dept: RADIOLOGY | Facility: OTHER | Age: 64
Discharge: HOME | End: 2024-08-21
Attending: INTERNAL MEDICINE
Payer: MEDICARE

## 2024-08-21 DIAGNOSIS — M85.80 OSTEOPENIA, UNSPECIFIED LOCATION: ICD-10-CM

## 2024-08-21 DIAGNOSIS — Z78.0 ASYMPTOMATIC MENOPAUSAL STATE: ICD-10-CM

## 2024-08-21 PROCEDURE — 77080 DXA BONE DENSITY AXIAL: CPT | Mod: TC

## 2024-08-21 PROCEDURE — 77080 DXA BONE DENSITY AXIAL: CPT | Mod: 26,,, | Performed by: RADIOLOGY

## 2024-08-22 NOTE — PROGRESS NOTES
Your DXA, or bone scan, is consistent with osteoporosis.  This means the density of your bones is lower than normal. This puts you at increase risk of fractures, especially as we age. In addition to calcium and vitamin D I would like to start you on a medication called a bisphosphonate. Below are some things to consider when taking a bisphosphonate, or Fosamax.  I recommend taking over the counter calcium and vitamin D, such as citracal-D or os-leonid-D, one tablet daily. I also recommend weight bearing exercises, for example resistance training, jogging, jumping, and/or walking, for 30 minutes a day at least 3 days a week. Also if you are a smoker, continuing to smoke will only accelerate your bone loss.     Fosamax for osteoporosis  1) Take first thing in the morning at least 30 minutes prior to first meal.   2) Take with 6-8 oz of water. Do not take with with other beverages.   3) Stay upright (not to lie down) for at least 30 minutes after taking medication    Respectfully,  Harrison Heart

## 2024-09-03 ENCOUNTER — PATIENT MESSAGE (OUTPATIENT)
Dept: BARIATRICS | Facility: CLINIC | Age: 64
End: 2024-09-03
Payer: MEDICARE

## 2024-09-10 ENCOUNTER — PATIENT MESSAGE (OUTPATIENT)
Dept: BARIATRICS | Facility: CLINIC | Age: 64
End: 2024-09-10
Payer: MEDICARE

## 2024-10-01 ENCOUNTER — PATIENT MESSAGE (OUTPATIENT)
Dept: BARIATRICS | Facility: CLINIC | Age: 64
End: 2024-10-01
Payer: MEDICARE

## 2024-10-08 ENCOUNTER — PATIENT MESSAGE (OUTPATIENT)
Dept: BARIATRICS | Facility: CLINIC | Age: 64
End: 2024-10-08
Payer: MEDICARE

## 2024-10-29 DIAGNOSIS — I10 BENIGN ESSENTIAL HYPERTENSION: ICD-10-CM

## 2024-10-29 DIAGNOSIS — E03.9 PRIMARY HYPOTHYROIDISM: ICD-10-CM

## 2024-10-29 RX ORDER — LEVOTHYROXINE SODIUM 112 UG/1
112 TABLET ORAL
Qty: 90 TABLET | Refills: 3 | Status: SHIPPED | OUTPATIENT
Start: 2024-10-29

## 2024-10-30 RX ORDER — HYDROCHLOROTHIAZIDE 12.5 MG/1
12.5 TABLET ORAL DAILY
Qty: 90 TABLET | Refills: 0 | Status: SHIPPED | OUTPATIENT
Start: 2024-10-30

## 2024-11-02 ENCOUNTER — PATIENT MESSAGE (OUTPATIENT)
Dept: BARIATRICS | Facility: CLINIC | Age: 64
End: 2024-11-02
Payer: MEDICARE

## 2024-11-12 ENCOUNTER — PATIENT MESSAGE (OUTPATIENT)
Dept: BARIATRICS | Facility: CLINIC | Age: 64
End: 2024-11-12
Payer: MEDICARE

## 2024-11-13 ENCOUNTER — PATIENT MESSAGE (OUTPATIENT)
Dept: ADMINISTRATIVE | Facility: HOSPITAL | Age: 64
End: 2024-11-13
Payer: MEDICARE

## 2024-12-03 ENCOUNTER — PATIENT MESSAGE (OUTPATIENT)
Dept: BARIATRICS | Facility: CLINIC | Age: 64
End: 2024-12-03
Payer: MEDICARE

## 2024-12-10 ENCOUNTER — PATIENT MESSAGE (OUTPATIENT)
Dept: BARIATRICS | Facility: CLINIC | Age: 64
End: 2024-12-10
Payer: MEDICARE

## 2024-12-11 ENCOUNTER — PATIENT MESSAGE (OUTPATIENT)
Dept: ADMINISTRATIVE | Facility: HOSPITAL | Age: 64
End: 2024-12-11
Payer: MEDICARE

## 2025-01-06 ENCOUNTER — PATIENT MESSAGE (OUTPATIENT)
Dept: BARIATRICS | Facility: CLINIC | Age: 65
End: 2025-01-06
Payer: MEDICARE

## 2025-01-14 ENCOUNTER — PATIENT MESSAGE (OUTPATIENT)
Dept: BARIATRICS | Facility: CLINIC | Age: 65
End: 2025-01-14
Payer: MEDICARE

## 2025-01-14 DIAGNOSIS — I10 BENIGN ESSENTIAL HYPERTENSION: ICD-10-CM

## 2025-01-14 DIAGNOSIS — E03.9 PRIMARY HYPOTHYROIDISM: ICD-10-CM

## 2025-01-14 RX ORDER — LEVOTHYROXINE SODIUM 112 UG/1
112 TABLET ORAL
Qty: 90 TABLET | Refills: 3 | Status: CANCELLED | OUTPATIENT
Start: 2025-01-14

## 2025-01-15 RX ORDER — HYDROCHLOROTHIAZIDE 12.5 MG/1
12.5 TABLET ORAL DAILY
Qty: 90 TABLET | Refills: 2 | Status: SHIPPED | OUTPATIENT
Start: 2025-01-15

## 2025-01-15 NOTE — TELEPHONE ENCOUNTER
Requested refill has been sent to pharmacy. If patient is due for visit then please schedule appointment. If not then please contact patient and see if they have home blood pressure readings or the ability to check their blood pressure at home. Please obtain their most recent blood pressure along with an average. If they do not have home BP then please schedule follow up within 7-10 days for nurse visit for BP check. Please make sure patient has all meds prescribed and is taking all medications prior to their nurse visit.     Respectfully,  Harrison Heart

## 2025-01-15 NOTE — TELEPHONE ENCOUNTER
Unable to retrieve patient chart and identify care due.  [facility] Embedded Care Due Messages. Reference number: [id number].   1/14/2025 8:34:28 PM CST

## 2025-01-15 NOTE — TELEPHONE ENCOUNTER
Refill Encounter    PCP Visits: Recent Visits  Date Type Provider Dept   08/07/24 Office Visit Harrison Kerr MD [facility] Internal Medicine   Showing recent visits within past 360 days and meeting all other requirements  Future Appointments  Date Type Provider Dept   02/10/25 Appointment Harrison Kerr MD [facility] Internal Medicine   Showing future appointments within next 720 days and meeting all other requirements     Last 3 Blood Pressure:   BP Readings from Last 3 Encounters:   08/07/24 (!) 140/86   05/23/24 124/82   10/18/23 132/80     Preferred Pharmacy:   [facility]/pharmacy #[id number] - [facility] [address]  [address] [facility] [address]  Phone: [phone] Fax: [phone]    Requested RX:  Requested Prescriptions     Pending Prescriptions Disp Refills    hydroCHLOROthiazide (HYDRODIURIL) 12.5 MG Tab 90 tablet 0     Sig: Take 1 tablet (12.5 mg total) by mouth once daily.      RX Route: Normal

## 2025-01-15 NOTE — TELEPHONE ENCOUNTER
Refill Routing Note   Medication(s) are not appropriate for processing by [facility] for the following reason(s):        Required vitals abnormal    [facility] action(s):  Defer               Appointments  past 12m or future 3m with PCP    Date Provider   Last Visit   8/7/2024 Harrison Kerr MD   Next Visit   2/10/2025 Harrison Kerr MD   ED visits in past 90 days: 0        Note composed:9:23 AM 01/15/2025

## 2025-01-16 ENCOUNTER — PATIENT MESSAGE (OUTPATIENT)
Dept: INTERNAL MEDICINE | Facility: CLINIC | Age: 65
End: 2025-01-16
Payer: MEDICARE

## 2025-02-10 ENCOUNTER — PATIENT MESSAGE (OUTPATIENT)
Dept: BARIATRICS | Facility: CLINIC | Age: 65
End: 2025-02-10
Payer: MEDICARE

## 2025-02-10 ENCOUNTER — PATIENT MESSAGE (OUTPATIENT)
Dept: INTERNAL MEDICINE | Facility: CLINIC | Age: 65
End: 2025-02-10

## 2025-02-10 ENCOUNTER — HOSPITAL ENCOUNTER (OUTPATIENT)
Dept: RADIOLOGY | Facility: OTHER | Age: 65
Discharge: HOME | End: 2025-02-10
Attending: INTERNAL MEDICINE
Payer: MEDICARE

## 2025-02-10 ENCOUNTER — OFFICE VISIT (OUTPATIENT)
Dept: INTERNAL MEDICINE | Facility: CLINIC | Age: 65
End: 2025-02-10
Attending: INTERNAL MEDICINE
Payer: MEDICARE

## 2025-02-10 VITALS
HEART RATE: 74 BPM | HEIGHT: 64 IN | BODY MASS INDEX: 31.12 KG/M2 | SYSTOLIC BLOOD PRESSURE: 120 MMHG | DIASTOLIC BLOOD PRESSURE: 76 MMHG | OXYGEN SATURATION: 99 % | WEIGHT: 182.31 LBS

## 2025-02-10 DIAGNOSIS — E03.9 PRIMARY HYPOTHYROIDISM: ICD-10-CM

## 2025-02-10 DIAGNOSIS — Z12.31 ENCOUNTER FOR SCREENING MAMMOGRAM FOR MALIGNANT NEOPLASM OF BREAST: ICD-10-CM

## 2025-02-10 DIAGNOSIS — M81.0 AGE-RELATED OSTEOPOROSIS WITHOUT CURRENT PATHOLOGICAL FRACTURE: ICD-10-CM

## 2025-02-10 DIAGNOSIS — E55.9 VITAMIN D DEFICIENCY DISEASE: ICD-10-CM

## 2025-02-10 DIAGNOSIS — Z12.11 COLON CANCER SCREENING: ICD-10-CM

## 2025-02-10 DIAGNOSIS — Z98.84 HISTORY OF GASTRIC BYPASS: ICD-10-CM

## 2025-02-10 DIAGNOSIS — I10 BENIGN ESSENTIAL HYPERTENSION: Primary | ICD-10-CM

## 2025-02-10 PROCEDURE — 77063 BREAST TOMOSYNTHESIS BI: CPT | Mod: 26,,, | Performed by: RADIOLOGY

## 2025-02-10 PROCEDURE — 99214 OFFICE O/P EST MOD 30 MIN: CPT | Mod: S$PBB,,, | Performed by: INTERNAL MEDICINE

## 2025-02-10 PROCEDURE — 77067 SCR MAMMO BI INCL CAD: CPT | Mod: 26,,, | Performed by: RADIOLOGY

## 2025-02-10 PROCEDURE — G2211 COMPLEX E/M VISIT ADD ON: HCPCS | Mod: S$PBB,,, | Performed by: INTERNAL MEDICINE

## 2025-02-10 PROCEDURE — 99999 PR PBB SHADOW E&M-EST. PATIENT-LVL III: CPT | Mod: PBBFAC,,, | Performed by: INTERNAL MEDICINE

## 2025-02-10 PROCEDURE — 77063 BREAST TOMOSYNTHESIS BI: CPT | Mod: TC

## 2025-02-10 PROCEDURE — 99213 OFFICE O/P EST LOW 20 MIN: CPT | Mod: PBBFAC | Performed by: INTERNAL MEDICINE

## 2025-02-10 RX ORDER — ALENDRONATE SODIUM 70 MG/1
TABLET ORAL
Qty: 12 TABLET | Refills: 3 | Status: SHIPPED | OUTPATIENT
Start: 2025-02-10

## 2025-02-10 NOTE — PROGRESS NOTES
Subjective:       Patient ID: Kim Garcia is a 64 y.o. female.    Chief Complaint: Hypertension (6 month follow up. ) and Digital Medicine (Pt would like to enroll in program. )    Here for routine f/u    Large left tympanomastoid effusion with dehiscence of the tegmen tympani raises concern for underlying CSF leak. No discernible encephalocele formation.   2.   Dehiscence of the right tegmen mastoideum. No right-sided tympanomastoid effusion or encephalocele.    Not taking fosamax. Julian reRx. Pt amenable. Some miscommunication. Taking OTC Vit d and calcium and   ### HTN ###  HCTZ 12.5mg  Denies frequent or current HA, intermittent blurry vision, dizziness, CP, or SOB.     ### hypothyroidism ###  Levothyroxine 112mcg  Pt denies fatigue, unexplained symone gain/loss, palpitations, tremors, diarrhea, constipation, changes to hair/skin, heat/cold intolerance, changes in voice or dysphagia.         ### osteopenia ###       -DXA (10/25/2019) Osteopenia  Lab Results       Component                Value               Date/Time                  [id number]             63                  02/17/2021 08:37 AM        ### plaque psoriasis ###  Prior Tx :Skyrizi      ### Hx gastric sleeve ###  MVI. Not taking vitamin D as consistently.                            History of Present Illness              Review of Systems   Constitutional:  Negative for chills, fatigue, fever and unexpected weight change.   HENT:  Negative for ear pain, hearing loss, postnasal drip, tinnitus, trouble swallowing and voice change.    Respiratory:  Negative for cough, chest tightness, shortness of breath and wheezing.    Cardiovascular:  Negative for chest pain, palpitations and leg swelling.   Gastrointestinal:  Negative for abdominal pain, blood in stool, diarrhea, nausea and vomiting.   Endocrine: Negative for polydipsia, polyphagia and polyuria.   Genitourinary:  Negative for difficulty urinating, dysuria, hematuria and vaginal bleeding.  "  Skin:  Negative for rash.   Allergic/Immunologic: Negative for food allergies.   Neurological:  Negative for dizziness, numbness and headaches.   Hematological:  Does not bruise/bleed easily.   Psychiatric/Behavioral:  The patient is not nervous/anxious.        Objective:      Vitals:    02/10/25 0947   BP: 120/76   BP Location: Left arm   Patient Position: Sitting   Pulse: 74   SpO2: 99%   Weight: 82.7 kg (182 lb 5.1 oz)   Height: 5' 4" (1.626 m)      Physical Exam  Vitals and nursing note reviewed.   Constitutional:       General: She is not in acute distress.     Appearance: Normal appearance. She is well-developed.   HENT:      Head: Normocephalic and atraumatic.      Mouth/Throat:      Pharynx: No oropharyngeal exudate.   Eyes:      General: No scleral icterus.     Conjunctiva/sclera: Conjunctivae normal.      Pupils: Pupils are equal, round, and reactive to light.   Neck:      Thyroid: No thyromegaly.   Cardiovascular:      Rate and Rhythm: Normal rate and regular rhythm.      Heart sounds: Normal heart sounds. No murmur heard.  Pulmonary:      Effort: Pulmonary effort is normal.      Breath sounds: Normal breath sounds. No wheezing or rales.   Abdominal:      General: There is no distension.   Musculoskeletal:         General: No tenderness.   Lymphadenopathy:      Cervical: No cervical adenopathy.   Skin:     General: Skin is warm and dry.   Neurological:      Mental Status: She is alert and oriented to person, place, and time.   Psychiatric:         Behavior: Behavior normal.         Assessment:       1. Benign essential hypertension    2. Primary hypothyroidism    3. History of gastric bypass    4. Vitamin D deficiency disease    5. Age-related osteoporosis without current pathological fracture    6. Colon cancer screening    7. Encounter for screening mammogram for malignant neoplasm of breast        Plan:       Kim was seen today for hypertension and digital medicine.    Diagnoses and all orders " for this visit:    Benign essential hypertension   Controlled and asymptomatic.  Continue current Rx regimen.    Primary hypothyroidism   Controlled and asymptomatic.  Continue current Rx regimen.    History of gastric bypass   Controlled and asymptomatic.  Continue current Rx regimen.    Vitamin D deficiency disease    Age-related osteoporosis without current pathological fracture  -    START alendronate (FOSAMAX) 70 MG tablet; Take 1 tablet once weekly in the morning with a full glass of water on an empty stomach. Do not consume food or lie down for at least 30 min    Colon cancer screening  -     Ambulatory referral/consult to Endo Procedure ; Future    Encounter for screening mammogram for malignant neoplasm of breast  -     Cancel: Mammo Digital Screening Bilat; Future    Visit today is associated with current or anticipated ongoing medical care related to this patient's single serious condition/complex condition of HTN, Hypothyroidism. The patient will return to see me as these issues will be followed longitudinally.           Assessment & Plan              This note was generated with the assistance of ambient listening technology. Verbal consent was obtained by the patient and accompanying visitor(s) for the recording of patient appointment to facilitate this note. I attest to having reviewed and edited the generated note for accuracy, though some syntax or spelling errors may persist. Please contact the author of this note for any clarification.      Harrison Heart MD  Internal Medicine-[facility]        Side effects of medication(s) were discussed in detail and patient voiced understanding.  Patient will call back for any issues or complications.

## 2025-02-24 ENCOUNTER — PATIENT MESSAGE (OUTPATIENT)
Dept: ENDOSCOPY | Facility: HOSPITAL | Age: 65
End: 2025-02-24

## 2025-02-24 ENCOUNTER — CLINICAL SUPPORT (OUTPATIENT)
Dept: ENDOSCOPY | Facility: HOSPITAL | Age: 65
End: 2025-02-24
Attending: INTERNAL MEDICINE
Payer: MEDICARE

## 2025-02-24 VITALS — HEIGHT: 64 IN | WEIGHT: 182 LBS | BODY MASS INDEX: 31.07 KG/M2

## 2025-02-24 DIAGNOSIS — Z00.00 ENCOUNTER FOR MEDICARE ANNUAL WELLNESS EXAM: ICD-10-CM

## 2025-02-24 DIAGNOSIS — Z12.11 COLON CANCER SCREENING: ICD-10-CM

## 2025-02-24 NOTE — PLAN OF CARE
Referral for procedure from PAT appointment      Spoke to patient to schedule procedure(s) Colonoscopy       Physician to perform procedure(s) Dr. NICO So  Date of Procedure (s) 03/06/2025  Arrival Time 12:30 PM   Time of Procedure(s) 1:30 Pm    Location of Procedure(s) [place] 4th Floor  Type of Rx Prep sent to patient: Sutab  Instructions provided to patient via [facility]    Patient was informed on the following information and verbalized understanding. Screening questionnaire reviewed with patient and complete. If procedure requires anesthesia, a responsible adult needs to be present to accompany the patient home, patient cannot drive after receiving anesthesia. Appointment details are tentative, especially check-in time. Patient will receive a prep-op call 7 days prior to confirm check-in time for procedure. If applicable the patient should contact their pharmacy to verify Rx for procedure prep is ready for pick-up. Patient was advised to call the scheduling department at [phone] if pharmacy states no Rx is available. Patient was advised to call the endoscopy scheduling department if any questions or concerns arise.      [facility] Endoscopy Scheduling Department

## 2025-02-28 ENCOUNTER — PATIENT MESSAGE (OUTPATIENT)
Dept: ENDOSCOPY | Facility: HOSPITAL | Age: 65
End: 2025-02-28
Payer: MEDICARE

## 2025-02-28 ENCOUNTER — TELEPHONE (OUTPATIENT)
Dept: ENDOSCOPY | Facility: HOSPITAL | Age: 65
End: 2025-02-28
Payer: MEDICARE

## 2025-02-28 NOTE — TELEPHONE ENCOUNTER
Received message from pre call team to r/s pt    Referral for procedure from PAT [id number]      Spoke to pt to schedule procedure(s) Colonoscopy       Physician to perform procedure(s) Dr. SALEEM Carter  Date of Procedure (s) 3/14/25  Arrival Time 10:20 AM  Time of Procedure(s) 11:20 AM   Location of Procedure(s) [place] 4th Floor  Type of Rx Prep sent to patient: Sutab  Instructions provided to patient via [facility]    Patient was informed on the following information and verbalized understanding. Screening questionnaire reviewed with patient and complete. If procedure requires anesthesia, a responsible adult needs to be present to accompany the patient home, patient cannot drive after receiving anesthesia. Appointment details are tentative, especially check-in time. Patient will receive a prep-op call 7 days prior to confirm check-in time for procedure. If applicable the patient should contact their pharmacy to verify Rx for procedure prep is ready for pick-up. Patient was advised to call the scheduling department at [phone] if pharmacy states no Rx is available. Patient was advised to call the endoscopy scheduling department if any questions or concerns arise.      [facility] Scheduling Department

## 2025-03-10 ENCOUNTER — PATIENT MESSAGE (OUTPATIENT)
Dept: BARIATRICS | Facility: CLINIC | Age: 65
End: 2025-03-10
Payer: MEDICARE

## 2025-03-14 ENCOUNTER — TELEPHONE (OUTPATIENT)
Dept: ENDOSCOPY | Facility: HOSPITAL | Age: 65
End: 2025-03-14
Payer: MEDICARE

## 2025-03-14 NOTE — TELEPHONE ENCOUNTER
Pt returned call and stated she was unable to get in touch with anyone to get her colonoscopy prep sent to pharmacy. She stated all people she spoke with sent urgent messages to endoscopy department starting at 10 am on 3/13/24 with no results.pt removed from schedule and case cancelled. Pt having colonoscopy with a different provider

## 2025-03-14 NOTE — TELEPHONE ENCOUNTER
Teams message sent at 5pm 3/13/25 from warren segovia that pt did not have prep forr colonoscopy 3/14/25. Called pt to see if this issue was resolved due to she is still on schedule but pt did not answer.lvm with call back number

## 2025-03-17 ENCOUNTER — TELEPHONE (OUTPATIENT)
Dept: ENDOSCOPY | Facility: HOSPITAL | Age: 65
End: 2025-03-17
Payer: MEDICARE

## 2025-03-17 NOTE — TELEPHONE ENCOUNTER
Copied from CRM #[id number]. Topic: General Inquiry - Patient Advice  >> Mar 13, 2025 10:02 AM Cricket wrote:  Name Of Caller: Kim        Provider Name:        Does patient feel the need to be seen today? no        Relationship to the Pt?: patient        Contact Preference?: [phone]        What is the nature of the call?:  Patient has a colonoscopy scheduled tomorrow Friday 3- at [facility], she states that she needs to speak with someone in the office in regards to her prep medications, she states that the prescriptions are not ready and have not been sent to [facility] yet.

## 2025-03-29 ENCOUNTER — TELEPHONE (OUTPATIENT)
Dept: ENDOSCOPY | Facility: HOSPITAL | Age: 65
End: 2025-03-29
Payer: MEDICARE

## 2025-03-29 NOTE — TELEPHONE ENCOUNTER
----- Message from Jenna sent at 3/13/2025  3:40 PM CDT -----  Regarding: FW: Urgent  Contact: [phone]    ----- Message -----  From: Raisa Abad RN  Sent: 3/13/2025   2:18 PM CDT  To: [facility] Endoscopy Schedulers  Subject: FW: Urgent                                         ----- Message -----  From: Oriana Vasquez  Sent: 3/13/2025   2:08 PM CDT  To: Emma Diaz Staff  Subject: Urgent                                           Who call ? Kim Garcia  What is the request Details : Pt calling to speak with someone in provider office regarding prep medication for colonoscopy on 3/14 . States she try calling no one called. Please call pt back at   Can clinic  use patient portal  : No What number to call back : [phone]

## 2025-04-08 ENCOUNTER — PATIENT MESSAGE (OUTPATIENT)
Dept: BARIATRICS | Facility: CLINIC | Age: 65
End: 2025-04-08
Payer: MEDICARE

## 2025-05-13 ENCOUNTER — PATIENT MESSAGE (OUTPATIENT)
Dept: BARIATRICS | Facility: CLINIC | Age: 65
End: 2025-05-13
Payer: MEDICARE

## 2025-06-03 ENCOUNTER — PATIENT MESSAGE (OUTPATIENT)
Dept: BARIATRICS | Facility: CLINIC | Age: 65
End: 2025-06-03
Payer: MEDICARE

## 2025-07-07 ENCOUNTER — PATIENT MESSAGE (OUTPATIENT)
Dept: BARIATRICS | Facility: CLINIC | Age: 65
End: 2025-07-07
Payer: MEDICARE

## 2025-07-08 ENCOUNTER — PATIENT MESSAGE (OUTPATIENT)
Dept: BARIATRICS | Facility: CLINIC | Age: 65
End: 2025-07-08
Payer: MEDICARE

## 2025-08-12 ENCOUNTER — PATIENT MESSAGE (OUTPATIENT)
Dept: BARIATRICS | Facility: CLINIC | Age: 65
End: 2025-08-12
Payer: MEDICARE